# Patient Record
Sex: FEMALE | Race: BLACK OR AFRICAN AMERICAN | NOT HISPANIC OR LATINO | URBAN - METROPOLITAN AREA
[De-identification: names, ages, dates, MRNs, and addresses within clinical notes are randomized per-mention and may not be internally consistent; named-entity substitution may affect disease eponyms.]

---

## 2017-11-01 ENCOUNTER — OUTPATIENT (OUTPATIENT)
Dept: OUTPATIENT SERVICES | Facility: HOSPITAL | Age: 37
LOS: 1 days | End: 2017-11-01
Payer: MEDICAID

## 2017-11-01 DIAGNOSIS — K08.409 PARTIAL LOSS OF TEETH, UNSPECIFIED CAUSE, UNSPECIFIED CLASS: Chronic | ICD-10-CM

## 2017-11-01 PROCEDURE — G9001: CPT

## 2017-11-13 ENCOUNTER — EMERGENCY (EMERGENCY)
Facility: HOSPITAL | Age: 37
LOS: 1 days | Discharge: HOME | End: 2017-11-13
Attending: EMERGENCY MEDICINE | Admitting: EMERGENCY MEDICINE
Payer: MEDICAID

## 2017-11-13 VITALS
RESPIRATION RATE: 16 BRPM | DIASTOLIC BLOOD PRESSURE: 80 MMHG | SYSTOLIC BLOOD PRESSURE: 138 MMHG | HEART RATE: 68 BPM | TEMPERATURE: 98 F | OXYGEN SATURATION: 100 %

## 2017-11-13 VITALS
SYSTOLIC BLOOD PRESSURE: 115 MMHG | HEART RATE: 58 BPM | OXYGEN SATURATION: 100 % | DIASTOLIC BLOOD PRESSURE: 82 MMHG | RESPIRATION RATE: 17 BRPM

## 2017-11-13 DIAGNOSIS — K08.409 PARTIAL LOSS OF TEETH, UNSPECIFIED CAUSE, UNSPECIFIED CLASS: Chronic | ICD-10-CM

## 2017-11-13 LAB
ALBUMIN SERPL ELPH-MCNC: 3.8 G/DL — SIGNIFICANT CHANGE UP (ref 3.3–5)
ALP SERPL-CCNC: 44 U/L — SIGNIFICANT CHANGE UP (ref 40–120)
ALT FLD-CCNC: 8 U/L — SIGNIFICANT CHANGE UP (ref 4–33)
APPEARANCE UR: CLEAR — SIGNIFICANT CHANGE UP
APTT BLD: 29.9 SEC — SIGNIFICANT CHANGE UP (ref 27.5–37.4)
AST SERPL-CCNC: 14 U/L — SIGNIFICANT CHANGE UP (ref 4–32)
BACTERIA # UR AUTO: SIGNIFICANT CHANGE UP
BASOPHILS # BLD AUTO: 0.05 K/UL — SIGNIFICANT CHANGE UP (ref 0–0.2)
BASOPHILS NFR BLD AUTO: 1.1 % — SIGNIFICANT CHANGE UP (ref 0–2)
BILIRUB SERPL-MCNC: 0.4 MG/DL — SIGNIFICANT CHANGE UP (ref 0.2–1.2)
BILIRUB UR-MCNC: NEGATIVE — SIGNIFICANT CHANGE UP
BLD GP AB SCN SERPL QL: NEGATIVE — SIGNIFICANT CHANGE UP
BLOOD UR QL VISUAL: HIGH
BUN SERPL-MCNC: 9 MG/DL — SIGNIFICANT CHANGE UP (ref 7–23)
CALCIUM SERPL-MCNC: 8.3 MG/DL — LOW (ref 8.4–10.5)
CHLORIDE SERPL-SCNC: 104 MMOL/L — SIGNIFICANT CHANGE UP (ref 98–107)
CO2 SERPL-SCNC: 22 MMOL/L — SIGNIFICANT CHANGE UP (ref 22–31)
COLOR SPEC: SIGNIFICANT CHANGE UP
CREAT SERPL-MCNC: 0.63 MG/DL — SIGNIFICANT CHANGE UP (ref 0.5–1.3)
EOSINOPHIL # BLD AUTO: 0.1 K/UL — SIGNIFICANT CHANGE UP (ref 0–0.5)
EOSINOPHIL NFR BLD AUTO: 2.1 % — SIGNIFICANT CHANGE UP (ref 0–6)
GLUCOSE SERPL-MCNC: 79 MG/DL — SIGNIFICANT CHANGE UP (ref 70–99)
GLUCOSE UR-MCNC: NEGATIVE — SIGNIFICANT CHANGE UP
HCG SERPL-ACNC: 361.3 MIU/ML — SIGNIFICANT CHANGE UP
HCT VFR BLD CALC: 30.6 % — LOW (ref 34.5–45)
HGB BLD-MCNC: 10 G/DL — LOW (ref 11.5–15.5)
IMM GRANULOCYTES # BLD AUTO: 0.01 # — SIGNIFICANT CHANGE UP
IMM GRANULOCYTES NFR BLD AUTO: 0.2 % — SIGNIFICANT CHANGE UP (ref 0–1.5)
INR BLD: 1.07 — SIGNIFICANT CHANGE UP (ref 0.88–1.17)
KETONES UR-MCNC: NEGATIVE — SIGNIFICANT CHANGE UP
LEUKOCYTE ESTERASE UR-ACNC: NEGATIVE — SIGNIFICANT CHANGE UP
LYMPHOCYTES # BLD AUTO: 1.15 K/UL — SIGNIFICANT CHANGE UP (ref 1–3.3)
LYMPHOCYTES # BLD AUTO: 24.2 % — SIGNIFICANT CHANGE UP (ref 13–44)
MCHC RBC-ENTMCNC: 24.5 PG — LOW (ref 27–34)
MCHC RBC-ENTMCNC: 32.7 % — SIGNIFICANT CHANGE UP (ref 32–36)
MCV RBC AUTO: 75 FL — LOW (ref 80–100)
MONOCYTES # BLD AUTO: 0.6 K/UL — SIGNIFICANT CHANGE UP (ref 0–0.9)
MONOCYTES NFR BLD AUTO: 12.6 % — SIGNIFICANT CHANGE UP (ref 2–14)
MUCOUS THREADS # UR AUTO: SIGNIFICANT CHANGE UP
NEUTROPHILS # BLD AUTO: 2.84 K/UL — SIGNIFICANT CHANGE UP (ref 1.8–7.4)
NEUTROPHILS NFR BLD AUTO: 59.8 % — SIGNIFICANT CHANGE UP (ref 43–77)
NITRITE UR-MCNC: NEGATIVE — SIGNIFICANT CHANGE UP
NRBC # FLD: 0 — SIGNIFICANT CHANGE UP
PH UR: 6 — SIGNIFICANT CHANGE UP (ref 4.6–8)
PLATELET # BLD AUTO: 153 K/UL — SIGNIFICANT CHANGE UP (ref 150–400)
PMV BLD: 13.1 FL — HIGH (ref 7–13)
POTASSIUM SERPL-MCNC: 3.7 MMOL/L — SIGNIFICANT CHANGE UP (ref 3.5–5.3)
POTASSIUM SERPL-SCNC: 3.7 MMOL/L — SIGNIFICANT CHANGE UP (ref 3.5–5.3)
PROT SERPL-MCNC: 7 G/DL — SIGNIFICANT CHANGE UP (ref 6–8.3)
PROT UR-MCNC: NEGATIVE — SIGNIFICANT CHANGE UP
PROTHROM AB SERPL-ACNC: 12 SEC — SIGNIFICANT CHANGE UP (ref 9.8–13.1)
RBC # BLD: 4.08 M/UL — SIGNIFICANT CHANGE UP (ref 3.8–5.2)
RBC # FLD: 16.7 % — HIGH (ref 10.3–14.5)
RBC CASTS # UR COMP ASSIST: SIGNIFICANT CHANGE UP (ref 0–?)
RH IG SCN BLD-IMP: POSITIVE — SIGNIFICANT CHANGE UP
SODIUM SERPL-SCNC: 140 MMOL/L — SIGNIFICANT CHANGE UP (ref 135–145)
SP GR SPEC: 1.02 — SIGNIFICANT CHANGE UP (ref 1–1.03)
SQUAMOUS # UR AUTO: SIGNIFICANT CHANGE UP
UROBILINOGEN FLD QL: NORMAL E.U. — SIGNIFICANT CHANGE UP (ref 0.1–0.2)
WBC # BLD: 4.75 K/UL — SIGNIFICANT CHANGE UP (ref 3.8–10.5)
WBC # FLD AUTO: 4.75 K/UL — SIGNIFICANT CHANGE UP (ref 3.8–10.5)
WBC UR QL: SIGNIFICANT CHANGE UP (ref 0–?)

## 2017-11-13 PROCEDURE — 99285 EMERGENCY DEPT VISIT HI MDM: CPT

## 2017-11-13 PROCEDURE — 76830 TRANSVAGINAL US NON-OB: CPT | Mod: 26

## 2017-11-13 RX ORDER — MORPHINE SULFATE 50 MG/1
4 CAPSULE, EXTENDED RELEASE ORAL ONCE
Qty: 0 | Refills: 0 | Status: DISCONTINUED | OUTPATIENT
Start: 2017-11-13 | End: 2017-11-13

## 2017-11-13 RX ORDER — ACETAMINOPHEN 500 MG
975 TABLET ORAL ONCE
Qty: 0 | Refills: 0 | Status: COMPLETED | OUTPATIENT
Start: 2017-11-13 | End: 2017-11-13

## 2017-11-13 RX ADMIN — MORPHINE SULFATE 4 MILLIGRAM(S): 50 CAPSULE, EXTENDED RELEASE ORAL at 15:25

## 2017-11-13 RX ADMIN — Medication 975 MILLIGRAM(S): at 13:30

## 2017-11-13 RX ADMIN — MORPHINE SULFATE 4 MILLIGRAM(S): 50 CAPSULE, EXTENDED RELEASE ORAL at 15:40

## 2017-11-13 NOTE — ED ADULT TRIAGE NOTE - CHIEF COMPLAINT QUOTE
Pt was sent  in by urgent care to R/O ectopic pregnancy pt is pregnant not sure how many weeks.  Pt c/o of pain left lower quadrant.  LMP 10/14/2017.

## 2017-11-13 NOTE — ED PROVIDER NOTE - OBJECTIVE STATEMENT
37f with no sig pmh, G5A2P2 p/w llp pain x 1 day and resolved vaginal bleeding. had bleeding monday to sunday, then started getting pain - 8/10 in llq. went to urgent care which sent her to ed to r/o ectopic as had + pregnancy test. + dysuria; no f/c/cough/cold/diarrhea/dysuria;

## 2017-11-13 NOTE — ED PROVIDER NOTE - PROGRESS NOTE DETAILS
pt tolerated po, will follow up with obgyn in 24hrs or return to ed for continued work up if cannot get apt;

## 2017-11-13 NOTE — ED PROVIDER NOTE - PSH
History of  X 2  2000  No significant past surgical history    repair of right pinky-tendon  2006  ? flexor tendon  S/P tooth extraction

## 2017-11-13 NOTE — ED PROVIDER NOTE - PHYSICAL EXAMINATION
pelvic exam: chaperoned by SILVA Chase: closed os, minimal left adnexal tenderness, no bleeding/discharge

## 2017-11-13 NOTE — ED PROVIDER NOTE - ATTENDING CONTRIBUTION TO CARE
Attending note:   After face to face evaluation of this patient, I concur with above noted hx, pe, and care plan for this patient. +Pregnant with l pelvic pain; patient not aware of pregnancy until today, when ucg positive in [facility].   +Pelvic pain left; ultrasound pending.

## 2017-11-13 NOTE — ED PROVIDER NOTE - MEDICAL DECISION MAKING DETAILS
llq pain, r/o ectopic, cbc, cmp type and screen, UA, hcg, pain  control, transvaginal sono, re-assess

## 2017-11-14 DIAGNOSIS — R69 ILLNESS, UNSPECIFIED: ICD-10-CM

## 2017-11-14 LAB — SPECIMEN SOURCE: SIGNIFICANT CHANGE UP

## 2017-11-15 ENCOUNTER — EMERGENCY (EMERGENCY)
Facility: HOSPITAL | Age: 37
LOS: 1 days | Discharge: HOME | End: 2017-11-15
Admitting: EMERGENCY MEDICINE
Payer: MEDICAID

## 2017-11-15 VITALS
DIASTOLIC BLOOD PRESSURE: 77 MMHG | HEART RATE: 57 BPM | SYSTOLIC BLOOD PRESSURE: 125 MMHG | RESPIRATION RATE: 20 BRPM | OXYGEN SATURATION: 100 % | TEMPERATURE: 98 F

## 2017-11-15 VITALS
OXYGEN SATURATION: 100 % | DIASTOLIC BLOOD PRESSURE: 66 MMHG | SYSTOLIC BLOOD PRESSURE: 113 MMHG | RESPIRATION RATE: 18 BRPM | TEMPERATURE: 98 F | HEART RATE: 67 BPM

## 2017-11-15 DIAGNOSIS — Z34.90 ENCOUNTER FOR SUPERVISION OF NORMAL PREGNANCY, UNSPECIFIED, UNSPECIFIED TRIMESTER: ICD-10-CM

## 2017-11-15 DIAGNOSIS — K08.409 PARTIAL LOSS OF TEETH, UNSPECIFIED CAUSE, UNSPECIFIED CLASS: Chronic | ICD-10-CM

## 2017-11-15 LAB
BACTERIA UR CULT: SIGNIFICANT CHANGE UP
HCG SERPL-ACNC: 878.1 MIU/ML — SIGNIFICANT CHANGE UP

## 2017-11-15 PROCEDURE — 76830 TRANSVAGINAL US NON-OB: CPT | Mod: 26

## 2017-11-15 PROCEDURE — 99243 OFF/OP CNSLTJ NEW/EST LOW 30: CPT | Mod: GC

## 2017-11-15 PROCEDURE — 99285 EMERGENCY DEPT VISIT HI MDM: CPT

## 2017-11-15 NOTE — CONSULT NOTE ADULT - ASSESSMENT
A/P   37y  G 7P2   presents with c/o vaginal bleeding,  abdominal pain, and cramping  found to have a b-HCG of  361.3 ()-> 878.1 (11/15)   .  Differenital includes threatened AB vs. ectopic pregnancy vs. viable IUP vs. spontanous  in progress.  Difficult to assess at this time however given appropriate rise in bhcg from 2 days ago is reassuring for likely a normal intrauterine pregnancy.  However, too difficult to make that determination at this time.

## 2017-11-15 NOTE — CONSULT NOTE ADULT - SUBJECTIVE AND OBJECTIVE BOX
ALY TRAN  37y  Female [id number]    HPI:  38 y/o  w/ LMP 10/14/17 (4w3d by LMP) here c/o abdominal cramping and bleeding.  Patient first had cramping and bleeding last monday for 2 days- not heavy- about 1 pad/day.  Patient reports that since then she had intermittant spotting.  On  she took a pregnancy test at home which was negative.  On Monday patient reports cramping and spotting continued so she went to urgent care who told her to come to the ED.  Cramping described as pressure in lower abdomen as well as some sharp pain.  Pain at time radiates to left leg and back.  Taking ibuprofen with relief of pain.  Denies ever having anything like this with her previous pregnancies.   Patinet denies vaginal discharge, itching.  Denies urinary pain, burning, hesitancy.  Is sexually active with one male parnter.  Last had sex .  Reports same pressure/pain with intercourse.  Patient is currently using withdrawal method for birth control.  This was unplanned pregnancy.  Patient is unsure if she desires this pregnancy.    +dizziness intermittently     Review of Systems:   Patient denies headaches, blurry vision, lightheadedness, CP, SOB, fevers, chills, nausea, vomiting, diarrhea, joint pain, leg edema.  +constipation    Name of GYN Physician:  Previously saw Dr. Astudillo however she no longer takes her insurance.  Was trying to get appt with [facility] women center in [place] but was unable to get appt until Dec 1st.      POB:  , -  x 2.   , , - eTOP x 3 w/ D&C.  - SAB x1 w/ D&C.     Pgyn: +fibroids (never had any surgery or intervention for them- was told they are small),  +cysts  (cysts that come and go- never had an intervention for them)   Denies endometriosis, STI's, Abnormal pap smears     Home meds: denies       Allergies    bananas, strawberries (Unknown)  latex (Rash)  peanuts (Anaphylaxis)  penicillin (Rash; Angioedema)  penicillin (Rash; Stomach Upset)  penicillin (Unknown)  shellfish. (Swelling)  Tree Nuts (Anaphylaxis)    Intolerances        PAST MEDICAL & SURGICAL HISTORY:  No pertinent past medical history  Obesity  S/P tooth extraction  repair of right pinky-tendon:   ? flexor tendon  History of  X 2: 2000    Social History:  Denies smoking use, drug use,   +occasional social alcohol use    Vital Signs Last 24 Hrs  T(C): 36.7 (15 Nov 2017 09:29), Max: 36.7 (15 Nov 2017 09:29)  T(F): 98 (15 Nov 2017 09:29), Max: 98 (15 Nov 2017 09:29)  HR: 74 (15 Nov 2017 12:50) (57 - 74)  BP: 117/80 (15 Nov 2017 12:50) (117/80 - 125/77)  BP(mean): --  RR: 18 (15 Nov 2017 12:50) (18 - 20)  SpO2: 99% (15 Nov 2017 12:50) (99% - 100%)    Physical Exam:   General: sitting comfortably in bed, NAD   HEENT: neck supple, full ROM  CV: RR S1S2 no m/r/g  Lungs: CTA b/l, good air flow b/l   Back: No CVA tenderness  Abd: Soft, non-tender, non-distended.  Bowel sounds present.    :  No bleeding on pad.    External labia wnl.  Bimanual exam with no cervical motion tenderness, uterus wnl, adnexa with no masses or fullness felt.   Mild L adnexal tenderness.  No rebound or guarding.  Cervix closed.  Speculum Exam: No active bleeding from os.  No old blood in os. Physiologic discharge.    Ext: non-tender b/l, no edema     LABS:      bhcg 361.3 ()-> 878.1 (11/15)          I&O's Detail    EXAM:  US TRANSVAGINAL        *** ADDENDUM 11/15/2017  ***    Typographical error in the impression of the initial report, which should   read as follows: Patient reportedly has a POSITIVE pregnancy test. No   intrauterine gestational sac or  suspicious adnexal mass. Differential diagnosis includes early   intrauterine  pregnancy, ectopic pregnancy and failed pregnancy. Close follow-up with  ultrasound and beta hCG levels is necessary to exclude ectopic pregnancy.      *** END OF ADDENDUM 11/15/2017  ***      PROCEDURE DATE:  2017         INTERPRETATION:  CLINICAL INFORMATION: Cramping, left-sided pain and   bleeding last week.    LMP: 2017.  Estimated gestational age by LMP: 4 weeks, 2 days    COMPARISON: 2007.    TECHNIQUE:     Endovaginal pelvic sonogram. Color and Spectral Doppler was performed.    FINDINGS:    Uterus: 12.0 x 5.5 x 7.3 cm.     Endometrium: 12 mm. No intrauterine gestation is identified.    Right ovary: 3.9 x 2.4 x 2.4 cm. Within normal limits.    Left ovary: 4.0 x 2.3 x 2.8 cm. Corpus luteum, measuring 2.1 x 1.5 x 2.1   cm.    Fluid: None.    IMPRESSION:  Patient reportedly has a pregnancy test. No intrauterine gestational sac   or suspicious adnexal mass. Differential diagnosis includes early   intrauterine pregnancy, ectopic pregnancy and failed pregnancy. Close   follow-up with ultrasound and beta hCG levels is necessary to exclude   ectopic pregnancy.         ***Please see the addendum at the top of this report. It may contain   additional important information or changes.****              RADIOLOGY & ADDITIONAL STUDIES:            EXAM:  US TRANSVAGINAL        *** ADDENDUM 11/15/2017  ***    Typographical error in the impression of the initial report, which should   read as follows: Patient reportedly has a POSITIVE pregnancy test. No   intrauterine gestational sac or  suspicious adnexal mass. Differential diagnosis includes early   intrauterine  pregnancy, ectopic pregnancy and failed pregnancy. Close follow-up with  ultrasound and beta hCG levels is necessary to exclude ectopic pregnancy.      *** END OF ADDENDUM 11/15/2017  ***      PROCEDURE DATE:  2017         INTERPRETATION:  CLINICAL INFORMATION: Cramping, left-sided pain and   bleeding last week.    LMP: 2017.  Estimated gestational age by LMP: 4 weeks, 2 days    COMPARISON: 2007.    TECHNIQUE:     Endovaginal pelvic sonogram. Color and Spectral Doppler was performed.    FINDINGS:    Uterus: 12.0 x 5.5 x 7.3 cm.     Endometrium: 12 mm. No intrauterine gestation is identified.    Right ovary: 3.9 x 2.4 x 2.4 cm. Within normal limits.    Left ovary: 4.0 x 2.3 x 2.8 cm. Corpus luteum, measuring 2.1 x 1.5 x 2.1   cm.    Fluid: None.    IMPRESSION:  Patient reportedly has a pregnancy test. No intrauterine gestational sac   or suspicious adnexal mass. Differential diagnosis includes early   intrauterine pregnancy, ectopic pregnancy and failed pregnancy. Close   follow-up with ultrasound and beta hCG levels is necessary to exclude   ectopic pregnancy.         ***Please see the addendum at the top of this report. It may contain   additional important information or changes.****      EXAM:  US TRANSVAGINAL        PROCEDURE DATE:  Nov 15 2017         INTERPRETATION:  CLINICAL INFORMATION: Episode of bleeding. Follow-up   prior pelvic ultrasound which did not demonstrate an intrauterine   gestation.    LMP: 2017.  Estimated gestational age by LMP: 4 weeks, 4 days    COMPARISON: None available.    TECHNIQUE:     Transabdominal and Endovaginal pelvic sonogram. Color and Spectral   Doppler was performed.    FINDINGS:    Uterus: 10.9 x 6.3 cm. Endometrial stripe measures 1.5 cm. No   intrauterine gestation.    Right ovary: 2.8 x 2.0 x 2.2 cm. Within normal limits.    Left ovary: 4.1 x 2.5 x 2.4 cm. Within normal limits.    Fluid: None.    IMPRESSION:  Positive beta hCG without intrauterine gestational sac or suspicious   adnexal mass. Differential diagnosis includes early intrauterine   pregnancy, ectopic pregnancy and failed pregnancy. Close follow-up with   ultrasound and beta hCG levels is necessary to exclude ectopic pregnancy.                   THOMAS ESQUIVEL M.D., ATTENDING RADIOLOGIST  This document has been electronically signed. Nov 15 2017  1:47PM

## 2017-11-15 NOTE — ED PROVIDER NOTE - PROGRESS NOTE DETAILS
beta increased from 361 to 878 appropriate for about 5 weeks gestation.  radiology unable to view gestational sac may be to early.  pt agrees to return to the ed for monitoring discussed with ob - patient to return to the ed in 2 days 11/17 for repeat beta hcg and on sunday 11/19 for repeat beta hcg and vaginal u/s.  pt may then follow up with her ob.  pt agrees with the plan discussed with ob - patient to return to the ed in 2 days 11/17 for repeat beta hcg and on sunday 11/19 for repeat beta hcg and vaginal u/s.  pt may then follow up with her ob.  pt agrees with the plan and cannot rule out ectopic at this time

## 2017-11-15 NOTE — ED PROVIDER NOTE - CHPI ED SYMPTOMS NEG
no fever/no abdominal pain/no chills/no vomiting/no back pain/no pain/no nausea/no discharge/no dysuria

## 2017-11-15 NOTE — ED PROVIDER NOTE - PLAN OF CARE
return to the ed in 2 days 11/17 for repeat beta hcg and on sunday 11/19 for repeat beta hcg and vaginal u/s.  pt may then follow up with her ob.  pt agrees with the plan

## 2017-11-15 NOTE — ED ADULT TRIAGE NOTE - CHIEF COMPLAINT QUOTE
pt with  cramping and vaginal spotting was seen in ED on 11/13, pt advised to return for repeat HCG and u/s . Pt was unable to see her own GYN .

## 2017-11-15 NOTE — CONSULT NOTE ADULT - PROBLEM SELECTOR RECOMMENDATION 9
-patient to follow up in 48 hours for repeat b-HCG in the ER  -Ectopic precautions reviewed with patient and  at bedside.  Discussed with patient importance of follow up for b-HCG given unknown pregnancy location at this time.  Patient expressed understanding.  All questions and concerns addressed to patient's apparent satisfaction.    -patient to be added to GYN b-hcg list.   -patient stable for d/c home from GYN perspective.  Primary managment per ED team.      Nadine Dominguez PGY-2   Patient to be seen by attending.

## 2017-11-15 NOTE — ED PROVIDER NOTE - OBJECTIVE STATEMENT
37 year old female G5A2P2 denies pmhx, pshx c section x2, flexor tendon and rotator repairs.  presents today for repeat bet hcg and transvaginal u/s.  LMP 10/14 developed vaginal bleeding 11/12 was seen at [facility] on 37 year old female G5A2P2 denies pmhx, pshx c section x2, flexor tendon and rotator repairs.  presents today for repeat beta hcg and transvaginal u/s.  LMP 10/14, developed vaginal bleeding, evaluated in ED at [facility] on 11/13 and instructed to follow up with ob in 2 days or return to ed.  patient unable to obtain appointment with ob until 12/1.  pain improved bleeding decreased.

## 2017-11-17 ENCOUNTER — EMERGENCY (EMERGENCY)
Facility: HOSPITAL | Age: 37
LOS: 1 days | Discharge: HOME | End: 2017-11-17
Attending: EMERGENCY MEDICINE | Admitting: EMERGENCY MEDICINE
Payer: MEDICAID

## 2017-11-17 VITALS
SYSTOLIC BLOOD PRESSURE: 125 MMHG | RESPIRATION RATE: 18 BRPM | TEMPERATURE: 98 F | DIASTOLIC BLOOD PRESSURE: 75 MMHG | OXYGEN SATURATION: 100 % | HEART RATE: 60 BPM

## 2017-11-17 DIAGNOSIS — Z34.90 ENCOUNTER FOR SUPERVISION OF NORMAL PREGNANCY, UNSPECIFIED, UNSPECIFIED TRIMESTER: ICD-10-CM

## 2017-11-17 DIAGNOSIS — K08.409 PARTIAL LOSS OF TEETH, UNSPECIFIED CAUSE, UNSPECIFIED CLASS: Chronic | ICD-10-CM

## 2017-11-17 LAB — HCG SERPL-ACNC: 2259 MIU/ML — SIGNIFICANT CHANGE UP

## 2017-11-17 PROCEDURE — 99284 EMERGENCY DEPT VISIT MOD MDM: CPT

## 2017-11-17 PROCEDURE — 99243 OFF/OP CNSLTJ NEW/EST LOW 30: CPT

## 2017-11-17 PROCEDURE — 76830 TRANSVAGINAL US NON-OB: CPT | Mod: 26

## 2017-11-17 NOTE — ED PROVIDER NOTE - MEDICAL DECISION MAKING DETAILS
Will repeat beta, last beta around 800, early to expect to see anything on US which was appropriate at that time, will contact OB/GYN.

## 2017-11-17 NOTE — CONSULT NOTE ADULT - SUBJECTIVE AND OBJECTIVE BOX
ALY TRAN  37y  Female [id number]    HPI:  36 y/o  w/ LMP 10/14/17 (4w5d by LMP) here c/o abdominal cramping and bleeding.  Patient reports intermittent abdominal cramping- mild- takes advil for relief.    Denies any further vaginal bleeding    Review of Systems:   Patient denies headaches, blurry vision, lightheadness, dizziness, CP, SOB, fevers, chills, nausea, vomiting, diarrhea, constipation, joint pain, leg edema.     Name of GYN Physician:  previously saw Dr. Astudillo however she no longer takes her insurance.  [facility] [place] but was unable to get appt until Dec 1st.        POB:  , -  x 2.   , , - eTOP x 3 w/ D&C.  - SAB x1 w/ D&C.     Pgyn: +fibroids (never had any surgery or intervention for them- was told they are small),  +cysts  (cysts that come and go- never had an intervention for them)   Denies endometriosis, STI's, Abnormal pap smears     Home meds: denies       Allergies    bananas, strawberries (Unknown)  latex (Rash)  peanuts (Anaphylaxis)  penicillin (Rash; Angioedema)  penicillin (Rash; Stomach Upset)  penicillin (Unknown)  shellfish. (Swelling)  Tree Nuts (Anaphylaxis)    Intolerances        PAST MEDICAL & SURGICAL HISTORY:  No pertinent past medical history  Obesity  S/P tooth extraction  repair of right pinky-tendon:   ? flexor tendon  History of  X 2: 2000    Social History:  Denies smoking use, drug use,   +occasional social alcohol use    Vital Signs Last 24 Hrs  T(C): 36.9 (2017 09:15), Max: 36.9 (2017 09:15)  T(F): 98.4 (2017 09:15), Max: 98.4 (2017 09:15)  HR: 60 (2017 09:15) (60 - 60)  BP: 125/75 (2017 09:15) (125/75 - 125/75)  BP(mean): --  RR: 18 (2017 09:15) (18 - 18)  SpO2: 100% (2017 09:15) (100% - 100%)    Physical Exam:   General: sitting comftorably in bed, NAD   HEENT: neck supple, full ROM  Abd: Soft, non-tender, non-distended.  Bowel sounds present.    Ext: non-tender b/l, no edema     LABS:      bhcg 361.3-> 878.1 (11/15)-> 2259 ()          I&O's Detail          RADIOLOGY & ADDITIONAL STUDIES:

## 2017-11-17 NOTE — CONSULT NOTE ADULT - PROBLEM SELECTOR RECOMMENDATION 9
-would recommend TVUS today to assess if any formation of gestational sac in uterus given b-hcg above discrimanatory zone and patient with continued abdomianl cramping  -if no GS w/ yolk sac seen will have patietn follow up in ED again on Sunday 11/19  -if GS w/ yolk sac seen patient can follow up with OBGYN on Dec. 1st and will be removed from GYN b-hcg list.   -GYN will continue to follow for now     Nadine Dominguez PGY-2   d/w Dr. Coyle -would recommend TVUS today to assess if any formation of gestational sac in uterus given b-hcg above discrimanatory zone and patient with continued abdominal cramping  -GYN will continue to follow for now     Nadine Dominguez PGY-2   d/w Dr. Coyle

## 2017-11-17 NOTE — CONSULT NOTE ADULT - ASSESSMENT
38 y/o  here for repeat b-hcg.  B-hcg has been rising appropriately.  Reassuring for normal intra-uterine pregnancy.  However, still difficult to assess at this time given continued abdominal cramping.

## 2017-11-17 NOTE — CHART NOTE - NSCHARTNOTEFT_GEN_A_CORE
R2 OB Update     Patient called to remind her to come for follow up b-hcg today in ER.  Patient reports she is on her way in now.  Denies any further bleeding but states she is still having some lower abdominal cramping.  Will f/u cg today when patient arrives in Ed.     Nadine Dominguez PGY-2

## 2017-11-17 NOTE — ED PROVIDER NOTE - PROGRESS NOTE DETAILS
Esteban COX- pt seen by gyn, taken off beta list and now can f/u with [facility] in 1 wk

## 2017-11-17 NOTE — ED PROVIDER NOTE - OBJECTIVE STATEMENT
36 y/o F , LMP 10/14 presents w/ pelvic cramping. told to come back to repeat beta as she had low beta hcg and empty gestational sac. Pt has no vag bleeding. Had x1 miscarriage in the past and rest of abortions were elective. Otherwise no nausea, vomiting, dysuria, no other complaints.

## 2017-11-17 NOTE — ED ADULT TRIAGE NOTE - CHIEF COMPLAINT QUOTE
pt arrives for repeat serum beta.  pt seen here 2 times this week.  Pt states "they say I am pregnant but cant see a pregnancy on sono."  c/o intermittent abd cramps.  denies vaginal bleeding.   well appearing female in nad.

## 2017-11-17 NOTE — CONSULT NOTE ADULT - ATTENDING COMMENTS
Pt seen and examined by me.  Probable early IUP with appropriate rise in Bhcg and resolution in bleeding.  F/up in 1wk or PRN.

## 2017-11-30 ENCOUNTER — APPOINTMENT (OUTPATIENT)
Dept: OBGYN | Facility: CLINIC | Age: 37
End: 2017-11-30
Payer: MEDICAID

## 2017-11-30 ENCOUNTER — ASOB RESULT (OUTPATIENT)
Age: 37
End: 2017-11-30

## 2017-11-30 VITALS
HEIGHT: 65 IN | BODY MASS INDEX: 32.15 KG/M2 | DIASTOLIC BLOOD PRESSURE: 76 MMHG | WEIGHT: 193 LBS | HEART RATE: 54 BPM | SYSTOLIC BLOOD PRESSURE: 132 MMHG

## 2017-11-30 DIAGNOSIS — Z78.9 OTHER SPECIFIED HEALTH STATUS: ICD-10-CM

## 2017-11-30 DIAGNOSIS — Z83.49 FAMILY HISTORY OF OTHER ENDOCRINE, NUTRITIONAL AND METABOLIC DISEASES: ICD-10-CM

## 2017-11-30 DIAGNOSIS — Z82.49 FAMILY HISTORY OF ISCHEMIC HEART DISEASE AND OTHER DISEASES OF THE CIRCULATORY SYSTEM: ICD-10-CM

## 2017-11-30 DIAGNOSIS — Z64.0 PROBLEMS RELATED TO UNWANTED PREGNANCY: ICD-10-CM

## 2017-11-30 DIAGNOSIS — Z80.3 FAMILY HISTORY OF MALIGNANT NEOPLASM OF BREAST: ICD-10-CM

## 2017-11-30 DIAGNOSIS — Z83.3 FAMILY HISTORY OF DIABETES MELLITUS: ICD-10-CM

## 2017-11-30 PROCEDURE — 99213 OFFICE O/P EST LOW 20 MIN: CPT

## 2017-11-30 PROCEDURE — 76817 TRANSVAGINAL US OBSTETRIC: CPT

## 2017-12-01 LAB
BASOPHILS # BLD AUTO: 0.05 K/UL
BASOPHILS NFR BLD AUTO: 0.8 %
EOSINOPHIL # BLD AUTO: 0.07 K/UL
EOSINOPHIL NFR BLD AUTO: 1.2 %
HCT VFR BLD CALC: 36.5 %
HGB BLD-MCNC: 11.4 G/DL
IMM GRANULOCYTES NFR BLD AUTO: 0.2 %
LYMPHOCYTES # BLD AUTO: 1.64 K/UL
LYMPHOCYTES NFR BLD AUTO: 27.8 %
MAN DIFF?: NORMAL
MCHC RBC-ENTMCNC: 23.9 PG
MCHC RBC-ENTMCNC: 31.2 GM/DL
MCV RBC AUTO: 76.5 FL
MONOCYTES # BLD AUTO: 0.62 K/UL
MONOCYTES NFR BLD AUTO: 10.5 %
NEUTROPHILS # BLD AUTO: 3.5 K/UL
NEUTROPHILS NFR BLD AUTO: 59.5 %
PLATELET # BLD AUTO: 191 K/UL
RBC # BLD: 4.77 M/UL
RBC # FLD: 17.9 %
WBC # FLD AUTO: 5.89 K/UL

## 2017-12-01 NOTE — CHART NOTE - NSCHARTNOTEFT_GEN_A_CORE
R2 OB Update    Patient confirmed follow up with private OBGYN on 11/30 w/ Dr. Coyle with US showing +IUP.  Patient to be removed from GYN b-hcg list.     Nadine Dominguez PGY-2

## 2017-12-04 LAB
ABO + RH PNL BLD: NORMAL
BACTERIA UR CULT: NORMAL
BLD GP AB SCN SERPL QL: NORMAL
C TRACH RRNA SPEC QL NAA+PROBE: NOT DETECTED
N GONORRHOEA RRNA SPEC QL NAA+PROBE: NOT DETECTED
SOURCE AMPLIFICATION: NORMAL

## 2017-12-11 ENCOUNTER — OUTPATIENT (OUTPATIENT)
Dept: OUTPATIENT SERVICES | Facility: HOSPITAL | Age: 37
LOS: 1 days | End: 2017-12-11
Payer: MEDICAID

## 2017-12-11 ENCOUNTER — APPOINTMENT (OUTPATIENT)
Dept: OBGYN | Facility: CLINIC | Age: 37
End: 2017-12-11
Payer: MEDICAID

## 2017-12-11 VITALS — SYSTOLIC BLOOD PRESSURE: 120 MMHG | DIASTOLIC BLOOD PRESSURE: 78 MMHG | WEIGHT: 193 LBS | BODY MASS INDEX: 32.12 KG/M2

## 2017-12-11 DIAGNOSIS — K08.409 PARTIAL LOSS OF TEETH, UNSPECIFIED CAUSE, UNSPECIFIED CLASS: Chronic | ICD-10-CM

## 2017-12-11 DIAGNOSIS — N76.0 ACUTE VAGINITIS: ICD-10-CM

## 2017-12-11 DIAGNOSIS — Z33.2 ENCOUNTER FOR ELECTIVE TERMINATION OF PREGNANCY: ICD-10-CM

## 2017-12-11 PROCEDURE — 99213 OFFICE O/P EST LOW 20 MIN: CPT

## 2017-12-11 PROCEDURE — G0463: CPT

## 2017-12-19 ENCOUNTER — RESULT REVIEW (OUTPATIENT)
Age: 37
End: 2017-12-19

## 2017-12-19 DIAGNOSIS — Z33.2 ENCOUNTER FOR ELECTIVE TERMINATION OF PREGNANCY: ICD-10-CM

## 2017-12-20 ENCOUNTER — APPOINTMENT (OUTPATIENT)
Dept: OBGYN | Facility: CLINIC | Age: 37
End: 2017-12-20
Payer: MEDICAID

## 2017-12-20 ENCOUNTER — OUTPATIENT (OUTPATIENT)
Dept: OUTPATIENT SERVICES | Facility: HOSPITAL | Age: 37
LOS: 1 days | End: 2017-12-20
Payer: MEDICAID

## 2017-12-20 VITALS — SYSTOLIC BLOOD PRESSURE: 140 MMHG | DIASTOLIC BLOOD PRESSURE: 82 MMHG | BODY MASS INDEX: 31.78 KG/M2 | WEIGHT: 191 LBS

## 2017-12-20 DIAGNOSIS — Z98.890 OTHER SPECIFIED POSTPROCEDURAL STATES: ICD-10-CM

## 2017-12-20 DIAGNOSIS — O03.4 INCOMPLETE SPONTANEOUS ABORTION W/OUT COMPLICATION: ICD-10-CM

## 2017-12-20 DIAGNOSIS — N76.0 ACUTE VAGINITIS: ICD-10-CM

## 2017-12-20 DIAGNOSIS — K08.409 PARTIAL LOSS OF TEETH, UNSPECIFIED CAUSE, UNSPECIFIED CLASS: Chronic | ICD-10-CM

## 2017-12-20 PROCEDURE — 58120 DILATION AND CURETTAGE: CPT | Mod: GC

## 2017-12-20 PROCEDURE — 58120 DILATION AND CURETTAGE: CPT

## 2017-12-21 ENCOUNTER — MESSAGE (OUTPATIENT)
Age: 37
End: 2017-12-21

## 2017-12-21 ENCOUNTER — MEDICATION RENEWAL (OUTPATIENT)
Age: 37
End: 2017-12-21

## 2017-12-28 ENCOUNTER — RESULT REVIEW (OUTPATIENT)
Age: 37
End: 2017-12-28

## 2017-12-28 DIAGNOSIS — O03.4 INCOMPLETE SPONTANEOUS ABORTION WITHOUT COMPLICATION: ICD-10-CM

## 2017-12-28 DIAGNOSIS — Z98.890 OTHER SPECIFIED POSTPROCEDURAL STATES: ICD-10-CM

## 2018-01-11 ENCOUNTER — APPOINTMENT (OUTPATIENT)
Dept: OBGYN | Facility: CLINIC | Age: 38
End: 2018-01-11
Payer: MEDICAID

## 2018-01-11 VITALS
DIASTOLIC BLOOD PRESSURE: 80 MMHG | HEIGHT: 65 IN | WEIGHT: 197 LBS | HEART RATE: 58 BPM | BODY MASS INDEX: 32.82 KG/M2 | SYSTOLIC BLOOD PRESSURE: 136 MMHG

## 2018-01-11 DIAGNOSIS — Z30.09 ENCOUNTER FOR OTHER GENERAL COUNSELING AND ADVICE ON CONTRACEPTION: ICD-10-CM

## 2018-01-11 DIAGNOSIS — N89.8 OTHER SPECIFIED NONINFLAMMATORY DISORDERS OF VAGINA: ICD-10-CM

## 2018-01-11 PROCEDURE — 99214 OFFICE O/P EST MOD 30 MIN: CPT

## 2018-01-12 PROBLEM — Z30.09 ENCOUNTER FOR OTHER GENERAL COUNSELING OR ADVICE ON CONTRACEPTION: Status: ACTIVE | Noted: 2018-01-12

## 2018-01-12 RX ORDER — MISOPROSTOL 200 UG/1
200 TABLET ORAL
Qty: 4 | Refills: 0 | Status: DISCONTINUED | COMMUNITY
Start: 2017-12-11 | End: 2018-01-12

## 2018-01-12 RX ORDER — OXYCODONE AND ACETAMINOPHEN 5; 325 MG/1; MG/1
5-325 TABLET ORAL
Qty: 6 | Refills: 0 | Status: DISCONTINUED | COMMUNITY
Start: 2017-12-11 | End: 2018-01-12

## 2018-01-12 RX ORDER — AZITHROMYCIN 500 MG/1
500 TABLET, FILM COATED ORAL
Qty: 2 | Refills: 0 | Status: DISCONTINUED | COMMUNITY
Start: 2017-12-11 | End: 2018-01-12

## 2018-01-12 RX ORDER — ONDANSETRON 4 MG/1
4 TABLET ORAL EVERY 6 HOURS
Qty: 4 | Refills: 0 | Status: DISCONTINUED | COMMUNITY
Start: 2017-12-11 | End: 2018-01-12

## 2018-01-12 RX ORDER — NORETHINDRONE ACETATE AND ETHINYL ESTRADIOL 1; .02 MG/1; MG/1
1-20 TABLET ORAL DAILY
Qty: 1 | Refills: 6 | Status: DISCONTINUED | COMMUNITY
Start: 2017-12-11 | End: 2018-01-12

## 2018-01-16 LAB
CANDIDA VAG CYTO: NOT DETECTED
G VAGINALIS+PREV SP MTYP VAG QL MICRO: NOT DETECTED
T VAGINALIS VAG QL WET PREP: NOT DETECTED

## 2018-02-03 ENCOUNTER — OUTPATIENT (OUTPATIENT)
Dept: OUTPATIENT SERVICES | Facility: HOSPITAL | Age: 38
LOS: 1 days | End: 2018-02-03

## 2018-02-03 VITALS
RESPIRATION RATE: 14 BRPM | OXYGEN SATURATION: 98 % | HEART RATE: 64 BPM | HEIGHT: 65.25 IN | SYSTOLIC BLOOD PRESSURE: 110 MMHG | DIASTOLIC BLOOD PRESSURE: 80 MMHG | TEMPERATURE: 97 F | WEIGHT: 192.02 LBS

## 2018-02-03 DIAGNOSIS — Z91.013 ALLERGY TO SEAFOOD: ICD-10-CM

## 2018-02-03 DIAGNOSIS — K08.409 PARTIAL LOSS OF TEETH, UNSPECIFIED CAUSE, UNSPECIFIED CLASS: Chronic | ICD-10-CM

## 2018-02-03 DIAGNOSIS — Z98.890 OTHER SPECIFIED POSTPROCEDURAL STATES: Chronic | ICD-10-CM

## 2018-02-03 DIAGNOSIS — Z30.09 ENCOUNTER FOR OTHER GENERAL COUNSELING AND ADVICE ON CONTRACEPTION: ICD-10-CM

## 2018-02-03 DIAGNOSIS — Z91.040 LATEX ALLERGY STATUS: ICD-10-CM

## 2018-02-03 LAB
BASOPHILS # BLD AUTO: 0.08 K/UL — SIGNIFICANT CHANGE UP (ref 0–0.2)
BASOPHILS NFR BLD AUTO: 2.1 % — HIGH (ref 0–2)
BLD GP AB SCN SERPL QL: NEGATIVE — SIGNIFICANT CHANGE UP
EOSINOPHIL # BLD AUTO: 0.11 K/UL — SIGNIFICANT CHANGE UP (ref 0–0.5)
EOSINOPHIL NFR BLD AUTO: 2.9 % — SIGNIFICANT CHANGE UP (ref 0–6)
HCT VFR BLD CALC: 30.4 % — LOW (ref 34.5–45)
HGB BLD-MCNC: 9.6 G/DL — LOW (ref 11.5–15.5)
IMM GRANULOCYTES # BLD AUTO: 0.01 # — SIGNIFICANT CHANGE UP
IMM GRANULOCYTES NFR BLD AUTO: 0.3 % — SIGNIFICANT CHANGE UP (ref 0–1.5)
LYMPHOCYTES # BLD AUTO: 1.11 K/UL — SIGNIFICANT CHANGE UP (ref 1–3.3)
LYMPHOCYTES # BLD AUTO: 28.8 % — SIGNIFICANT CHANGE UP (ref 13–44)
MCHC RBC-ENTMCNC: 23.1 PG — LOW (ref 27–34)
MCHC RBC-ENTMCNC: 31.6 % — LOW (ref 32–36)
MCV RBC AUTO: 73.3 FL — LOW (ref 80–100)
MONOCYTES # BLD AUTO: 0.63 K/UL — SIGNIFICANT CHANGE UP (ref 0–0.9)
MONOCYTES NFR BLD AUTO: 16.4 % — HIGH (ref 2–14)
NEUTROPHILS # BLD AUTO: 1.91 K/UL — SIGNIFICANT CHANGE UP (ref 1.8–7.4)
NEUTROPHILS NFR BLD AUTO: 49.5 % — SIGNIFICANT CHANGE UP (ref 43–77)
NRBC # FLD: 0 — SIGNIFICANT CHANGE UP
PLATELET # BLD AUTO: 232 K/UL — SIGNIFICANT CHANGE UP (ref 150–400)
PMV BLD: 13.1 FL — HIGH (ref 7–13)
RBC # BLD: 4.15 M/UL — SIGNIFICANT CHANGE UP (ref 3.8–5.2)
RBC # FLD: 15.9 % — HIGH (ref 10.3–14.5)
RH IG SCN BLD-IMP: POSITIVE — SIGNIFICANT CHANGE UP
WBC # BLD: 3.85 K/UL — SIGNIFICANT CHANGE UP (ref 3.8–10.5)
WBC # FLD AUTO: 3.85 K/UL — SIGNIFICANT CHANGE UP (ref 3.8–10.5)

## 2018-02-03 RX ORDER — SODIUM CHLORIDE 9 MG/ML
1000 INJECTION, SOLUTION INTRAVENOUS
Qty: 0 | Refills: 0 | Status: DISCONTINUED | OUTPATIENT
Start: 2018-02-16 | End: 2018-02-16

## 2018-02-03 NOTE — H&P PST ADULT - NEGATIVE MUSCULOSKELETAL SYMPTOMS
no leg pain R/no muscle cramps/no arm pain L/no arm pain R/no muscle weakness/no leg pain L no muscle cramps/no stiffness/no muscle weakness/no back pain/no neck pain/no arm pain L/no arm pain R/no leg pain R/no leg pain L

## 2018-02-03 NOTE — H&P PST ADULT - NSANTHOSAYNRD_GEN_A_CORE
No. DAYDAY screening performed.  STOP BANG Legend: 0-2 = LOW Risk; 3-4 = INTERMEDIATE Risk; 5-8 = HIGH Risk

## 2018-02-03 NOTE — H&P PST ADULT - GASTROINTESTINAL DETAILS
soft/no organomegaly/no rebound tenderness/nontender/no guarding/no masses palpable/bowel sounds normal/no distention

## 2018-02-03 NOTE — H&P PST ADULT - NEGATIVE GENERAL GENITOURINARY SYMPTOMS
no urinary hesitancy/no dysuria/no hematuria/normal urinary frequency/no bladder infections/no renal colic

## 2018-02-03 NOTE — H&P PST ADULT - FAMILY HISTORY
Father  Still living? Yes, Estimated age: Age Unknown  Family history of diabetes mellitus (DM), Age at diagnosis: Age Unknown     Mother  Still living? Yes, Estimated age: Age Unknown  Family history of hypertension in mother, Age at diagnosis: Age Unknown  Family history of hypothyroidism, Age at diagnosis: Age Unknown

## 2018-02-03 NOTE — H&P PST ADULT - NEGATIVE CARDIOVASCULAR SYMPTOMS
no orthopnea/no paroxysmal nocturnal dyspnea/no palpitations/no claudication/no peripheral edema/no chest pain/no dyspnea on exertion

## 2018-02-03 NOTE — H&P PST ADULT - PROBLEM SELECTOR PLAN 1
scheduled for laparoscopic salpingectomy on 02/16/18  pending labs, surgical scrub & preop instructions given & explained, pt verbalized understanding scheduled for laparoscopic bilateral salpingectomy on 02/16/18  pending labs, surgical scrub & preop instructions given & explained, pt verbalized understanding  instructed to bring urine sample am of surgery for UCG

## 2018-02-03 NOTE — H&P PST ADULT - EAR CANAL R
inflamed walls/pt states she cleaned ears this am, wax noted pressed to sides, no c/o pain or drainage noted

## 2018-02-03 NOTE — H&P PST ADULT - NEUROLOGICAL DETAILS
cranial nerves intact/normal strength/no spontaneous movement/sensation intact/responds to pain/responds to verbal commands/alert and oriented x 3

## 2018-02-03 NOTE — H&P PST ADULT - PMH
No pertinent past medical history    Obesity    Vaginal bleeding Murmur, heart  Funtional  Obesity    Vaginal bleeding Murmur, heart  Functional  Obesity    Vaginal bleeding

## 2018-02-03 NOTE — H&P PST ADULT - PSH
H/O reduction mammoplasty  2011  History of arthroscopy of both shoulders  left  x 2 - 2016  & 2016  History of  X 2  2000  repair of right pinky-tendon  2006  ? flexor tendon  S/P tooth extraction

## 2018-02-15 NOTE — ASU PATIENT PROFILE, ADULT - NS TRANSFER HEARING AID
[id number]

## 2018-02-16 ENCOUNTER — APPOINTMENT (OUTPATIENT)
Dept: OBGYN | Facility: HOSPITAL | Age: 38
End: 2018-02-16

## 2018-02-16 ENCOUNTER — OUTPATIENT (OUTPATIENT)
Dept: OUTPATIENT SERVICES | Facility: HOSPITAL | Age: 38
LOS: 1 days | Discharge: HOME | End: 2018-02-16
Payer: MEDICAID

## 2018-02-16 ENCOUNTER — RESULT REVIEW (OUTPATIENT)
Age: 38
End: 2018-02-16

## 2018-02-16 VITALS
HEART RATE: 61 BPM | DIASTOLIC BLOOD PRESSURE: 71 MMHG | TEMPERATURE: 99 F | RESPIRATION RATE: 14 BRPM | OXYGEN SATURATION: 99 % | SYSTOLIC BLOOD PRESSURE: 121 MMHG | HEIGHT: 65 IN | WEIGHT: 192.02 LBS

## 2018-02-16 VITALS
SYSTOLIC BLOOD PRESSURE: 138 MMHG | OXYGEN SATURATION: 100 % | HEART RATE: 65 BPM | RESPIRATION RATE: 14 BRPM | DIASTOLIC BLOOD PRESSURE: 84 MMHG

## 2018-02-16 DIAGNOSIS — Z98.890 OTHER SPECIFIED POSTPROCEDURAL STATES: Chronic | ICD-10-CM

## 2018-02-16 DIAGNOSIS — K08.409 PARTIAL LOSS OF TEETH, UNSPECIFIED CAUSE, UNSPECIFIED CLASS: Chronic | ICD-10-CM

## 2018-02-16 DIAGNOSIS — Z30.09 ENCOUNTER FOR OTHER GENERAL COUNSELING AND ADVICE ON CONTRACEPTION: ICD-10-CM

## 2018-02-16 PROCEDURE — 58661 LAPAROSCOPY REMOVE ADNEXA: CPT | Mod: GC

## 2018-02-16 PROCEDURE — 88302 TISSUE EXAM BY PATHOLOGIST: CPT | Mod: 26

## 2018-02-16 RX ORDER — SODIUM CHLORIDE 9 MG/ML
1000 INJECTION, SOLUTION INTRAVENOUS
Qty: 0 | Refills: 0 | Status: DISCONTINUED | OUTPATIENT
Start: 2018-02-16 | End: 2018-02-17

## 2018-02-16 RX ORDER — IBUPROFEN 200 MG
600 TABLET ORAL EVERY 6 HOURS
Qty: 0 | Refills: 0 | Status: DISCONTINUED | OUTPATIENT
Start: 2018-02-16 | End: 2018-02-17

## 2018-02-16 RX ORDER — NORETHINDRONE AND ETHINYL ESTRADIOL 0.4-0.035
1 KIT ORAL
Qty: 0 | Refills: 0 | COMMUNITY

## 2018-02-16 RX ORDER — IBUPROFEN 200 MG
1 TABLET ORAL
Qty: 0 | Refills: 0 | DISCHARGE
Start: 2018-02-16

## 2018-02-16 RX ORDER — ACETAMINOPHEN 500 MG
975 TABLET ORAL EVERY 6 HOURS
Qty: 0 | Refills: 0 | Status: DISCONTINUED | OUTPATIENT
Start: 2018-02-16 | End: 2018-02-17

## 2018-02-16 RX ORDER — ACETAMINOPHEN 500 MG
3 TABLET ORAL
Qty: 0 | Refills: 0 | DISCHARGE
Start: 2018-02-16

## 2018-02-16 RX ORDER — OXYCODONE HYDROCHLORIDE 5 MG/1
1 TABLET ORAL
Qty: 4 | Refills: 0
Start: 2018-02-16 | End: 2018-02-16

## 2018-02-16 RX ORDER — DIPHENHYDRAMINE HCL 50 MG
1 CAPSULE ORAL
Qty: 0 | Refills: 0 | COMMUNITY

## 2018-02-16 RX ADMIN — SODIUM CHLORIDE 30 MILLILITER(S): 9 INJECTION, SOLUTION INTRAVENOUS at 07:20

## 2018-02-16 NOTE — ASU DISCHARGE PLAN (ADULT/PEDIATRIC). - ACTIVITY LEVEL
Nothing in vagina, no intercourse, no douching, no tampons, no tub baths, and no swimming for about 2 weeks or until cleared by MD. Contact your doctor if you are soaking a pad every hour, experience foul smelling discharge, or are noticing blood clots dime-sized or the size of your fist on your pad./no sports/gym/no intercourse/no exercise/no heavy lifting/no tampons/nothing per vagina/no tub baths/no douching

## 2018-02-16 NOTE — ASU DISCHARGE PLAN (ADULT/PEDIATRIC). - NOTIFY
Persistent Nausea and Vomiting/Unable to Urinate/Pain not relieved by Medications/GYN Fever>100.4/Signs of infection: redness around surgical site, hot and tender to the touch, pus drainage of any kind accompanied by foul odor/Bleeding that does not stop

## 2018-02-16 NOTE — BRIEF OPERATIVE NOTE - PROCEDURE
<<-----Click on this checkbox to enter Procedure Salpingectomy, bilateral, laparoscopic  02/16/2018    Active  [id number]

## 2018-02-16 NOTE — ASU DISCHARGE PLAN (ADULT/PEDIATRIC). - NURSING INSTRUCTIONS
You were given IV Tylenol for pain management in the Operating Room.  Please NOT take tylenol/acetaminophen products for the next 6-8 hours (after  215PM ).  You were given Toradol for pain management in the Operating Room. Please do NOT take Ibuprofen (NSAIDS) products (Motrin, Aleve, Advil, Excedrin) for the next 6-8 hours (After 215PM).  Refer to medication reconcillation sheet attached with discharge instruction paperwork.

## 2018-02-16 NOTE — ASU DISCHARGE PLAN (ADULT/PEDIATRIC). - MEDICATION SUMMARY - MEDICATIONS TO STOP TAKING
I will STOP taking the medications listed below when I get home from the hospital:    Junel 1/20 oral tablet  -- 1 tab(s) by mouth once a day    Benadryl 25 mg oral tablet  -- 1 tab(s) by mouth 3 times a day, As Needed

## 2018-02-16 NOTE — ASU DISCHARGE PLAN (ADULT/PEDIATRIC). - MEDICATION SUMMARY - MEDICATIONS TO TAKE
I will START or STAY ON the medications listed below when I get home from the hospital:    acetaminophen 325 mg oral tablet  -- 3 tab(s) by mouth every 6 hours  -- Indication: For Pain    ibuprofen 600 mg oral tablet  -- 1 tab(s) by mouth every 6 hours  -- Indication: For Pain    oxyCODONE 5 mg oral tablet  -- 1 tab(s) by mouth every 6 hours PRN severe pain MDD:4  -- Caution federal law prohibits the transfer of this drug to any person other  than the person for whom it was prescribed.  It is very important that you take or use this exactly as directed.  Do not skip doses or discontinue unless directed by your doctor.  May cause drowsiness.  Alcohol may intensify this effect.  Use care when operating dangerous machinery.  This prescription cannot be refilled.  Using more of this medication than prescribed may cause serious breathing problems.    -- Indication: For Severe pain

## 2018-02-21 ENCOUNTER — TRANSCRIPTION ENCOUNTER (OUTPATIENT)
Age: 38
End: 2018-02-21

## 2018-02-21 LAB — SURGICAL PATHOLOGY STUDY: SIGNIFICANT CHANGE UP

## 2018-03-01 ENCOUNTER — OUTPATIENT (OUTPATIENT)
Dept: OUTPATIENT SERVICES | Facility: HOSPITAL | Age: 38
LOS: 1 days | End: 2018-03-01
Payer: MEDICAID

## 2018-03-01 DIAGNOSIS — K08.409 PARTIAL LOSS OF TEETH, UNSPECIFIED CAUSE, UNSPECIFIED CLASS: Chronic | ICD-10-CM

## 2018-03-01 DIAGNOSIS — Z98.890 OTHER SPECIFIED POSTPROCEDURAL STATES: Chronic | ICD-10-CM

## 2018-03-01 PROCEDURE — G9001: CPT

## 2018-03-02 ENCOUNTER — APPOINTMENT (OUTPATIENT)
Dept: OBGYN | Facility: CLINIC | Age: 38
End: 2018-03-02
Payer: MEDICAID

## 2018-03-02 VITALS
HEIGHT: 65 IN | HEART RATE: 60 BPM | SYSTOLIC BLOOD PRESSURE: 145 MMHG | WEIGHT: 192 LBS | BODY MASS INDEX: 31.99 KG/M2 | DIASTOLIC BLOOD PRESSURE: 79 MMHG

## 2018-03-02 LAB
BILIRUB UR QL STRIP: NORMAL
GLUCOSE UR-MCNC: NORMAL
HCG UR QL: 0.2 EU/DL
HGB UR QL STRIP.AUTO: NORMAL
KETONES UR-MCNC: NORMAL
LEUKOCYTE ESTERASE UR QL STRIP: NORMAL
NITRITE UR QL STRIP: NORMAL
PH UR STRIP: 7
PROT UR STRIP-MCNC: NORMAL
SP GR UR STRIP: 1.01

## 2018-03-02 PROCEDURE — 99213 OFFICE O/P EST LOW 20 MIN: CPT

## 2018-03-02 RX ORDER — CLINDAMYCIN HYDROCHLORIDE 150 MG/1
150 CAPSULE ORAL
Qty: 28 | Refills: 0 | Status: DISCONTINUED | COMMUNITY
Start: 2017-10-05

## 2018-03-02 RX ORDER — OXYCODONE 5 MG/1
5 TABLET ORAL
Qty: 4 | Refills: 0 | Status: DISCONTINUED | COMMUNITY
Start: 2018-02-16

## 2018-03-04 LAB — BACTERIA UR CULT: NORMAL

## 2018-03-06 DIAGNOSIS — R69 ILLNESS, UNSPECIFIED: ICD-10-CM

## 2018-05-22 ENCOUNTER — EMERGENCY (EMERGENCY)
Facility: HOSPITAL | Age: 38
LOS: 1 days | Discharge: HOME | End: 2018-05-22
Admitting: EMERGENCY MEDICINE
Payer: MEDICAID

## 2018-05-22 VITALS
TEMPERATURE: 98 F | RESPIRATION RATE: 68 BRPM | OXYGEN SATURATION: 100 % | DIASTOLIC BLOOD PRESSURE: 80 MMHG | HEART RATE: 65 BPM | SYSTOLIC BLOOD PRESSURE: 157 MMHG

## 2018-05-22 DIAGNOSIS — Z98.890 OTHER SPECIFIED POSTPROCEDURAL STATES: Chronic | ICD-10-CM

## 2018-05-22 DIAGNOSIS — K08.409 PARTIAL LOSS OF TEETH, UNSPECIFIED CAUSE, UNSPECIFIED CLASS: Chronic | ICD-10-CM

## 2018-05-22 DIAGNOSIS — Z98.51 TUBAL LIGATION STATUS: Chronic | ICD-10-CM

## 2018-05-22 PROCEDURE — 99283 EMERGENCY DEPT VISIT LOW MDM: CPT

## 2018-05-22 RX ORDER — OXYCODONE HYDROCHLORIDE 5 MG/1
1 TABLET ORAL
Qty: 8 | Refills: 0
Start: 2018-05-22 | End: 2018-05-23

## 2018-05-22 RX ORDER — OXYCODONE AND ACETAMINOPHEN 5; 325 MG/1; MG/1
1 TABLET ORAL ONCE
Qty: 0 | Refills: 0 | Status: DISCONTINUED | OUTPATIENT
Start: 2018-05-22 | End: 2018-05-22

## 2018-05-22 RX ORDER — CHLORHEXIDINE GLUCONATE 213 G/1000ML
15 SOLUTION TOPICAL
Qty: 1 | Refills: 0
Start: 2018-05-22 | End: 2018-06-04

## 2018-05-22 RX ADMIN — OXYCODONE AND ACETAMINOPHEN 1 TABLET(S): 5; 325 TABLET ORAL at 18:44

## 2018-05-22 NOTE — ED PROVIDER NOTE - PLAN OF CARE
Advance activity as tolerated.  Continue all previously prescribed medications as directed unless otherwise instructed.  Take Clindamycin three times a day.  Use Peridex mouth was as prescribed.  Take Motrin (also sold as Advil or Ibuprofen) 400-600 mg (two or three 200 mg over the counter pills) every 8 hours as needed for moderate pain -- take with food. Take Percocet 325/5 once every 6 hours as needed for severe pain -- causes drowsiness; DO NOT drink alcohol, drive, or operate heavy machinery with this medication.  Caution federal law prohibits the transfer of this drug to any person other  than the person for whom it was prescribed. May cause drowsiness.  Alcohol may intensify this effect.  Use care when operating dangerous machinery.  This prescription cannot be refilled. Using more of this medication than prescribed may cause serious breathing problems Follow up with your primary care physician in 48-72 hours- bring copies of your results.  Return to the ER for worsening or persistent symptoms, including but not limited to fevers, worsening/persistent pain/swelling, facial swelling, headaches and/or ANY NEW OR CONCERNING SYMPTOMS. If you have issues obtaining follow up, please call: [phone] to obtain a doctor or specialist who takes your insurance in your area.  You may call [phone] to make an appointment with the internal medicine clinic.

## 2018-05-22 NOTE — ED PROVIDER NOTE - OBJECTIVE STATEMENT
Pt is a 38 y/o F nonsmoker PMHx migraines, h/o tubal ligation,  x 2 p/w dental pain x 4 days.  Pt notes gradual onset pain to left maxillary central incisor associated with gradual onset swelling and redness to gingiva surrounding tooth. Pt notes pain worsens with brushing teeth and chewing.  Mildly improves with IBU; last dose was 3 hrs ago.  Pt states she has an appointment with a dentist in a few days, but presents to ED requesting pain control.  Contrary to triage note, pt denies headache.  Denies any fevers, chills, difficulty swallowing, trauma, purulent drainage, illicit drug use.

## 2018-05-22 NOTE — ED PROVIDER NOTE - PROGRESS NOTE DETAILS
SILVA TODD:  Pt requesting to go home.  Pt refusing to see dentist today.  Electing to follow up with dentist on outpatient basis.  Pt medically stable for discharge.

## 2018-05-22 NOTE — ED PROVIDER NOTE - CARE PLAN
Principal Discharge DX:	Gingival disease  Assessment and plan of treatment:	Advance activity as tolerated.  Continue all previously prescribed medications as directed unless otherwise instructed.  Take Clindamycin three times a day.  Use Peridex mouth was as prescribed.  Take Motrin (also sold as Advil or Ibuprofen) 400-600 mg (two or three 200 mg over the counter pills) every 8 hours as needed for moderate pain -- take with food. Take Percocet 325/5 once every 6 hours as needed for severe pain -- causes drowsiness; DO NOT drink alcohol, drive, or operate heavy machinery with this medication.  Caution federal law prohibits the transfer of this drug to any person other  than the person for whom it was prescribed. May cause drowsiness.  Alcohol may intensify this effect.  Use care when operating dangerous machinery.  This prescription cannot be refilled. Using more of this medication than prescribed may cause serious breathing problems Follow up with your primary care physician in 48-72 hours- bring copies of your results.  Return to the ER for worsening or persistent symptoms, including but not limited to fevers, worsening/persistent pain/swelling, facial swelling, headaches and/or ANY NEW OR CONCERNING SYMPTOMS. If you have issues obtaining follow up, please call: [phone] to obtain a doctor or specialist who takes your insurance in your area.  You may call [phone] to make an appointment with the internal medicine clinic.

## 2018-05-22 NOTE — ED PROVIDER NOTE - PSH
H/O reduction mammoplasty  2011  H/O tubal ligation    History of arthroscopy of both shoulders  left  x 2 - 2016  & 2016  History of  X 2  2000  repair of right pinky-tendon    ? flexor tendon  S/P tooth extraction

## 2018-05-22 NOTE — ED PROVIDER NOTE - MEDICAL DECISION MAKING DETAILS
Pt is a 36 y/o F nonsmoker PMHx migraines, h/o tubal ligation,  x 2 p/w dental pain x 4 days -- likely gingival disease, no e/o periapical abscess; no ulcerations/lesions -- pain control, antibiotics, outpatient dental follow up

## 2018-05-22 NOTE — ED ADULT TRIAGE NOTE - CHIEF COMPLAINT QUOTE
Pt c/o swelling and pain to upper front tooth are since Friday , and headache since today.  Pt took tylenol at 12, and ibuprofen at 3pm

## 2018-05-22 NOTE — ED PROVIDER NOTE - TOOTH FINDINGS
+minimal swelling and erythema to gingiva surrounding left maxillary central incisor left maxillary central incisor not mobile; no percussion tenderness; +minimal swelling and erythema to gingiva surrounding left maxillary central incisor

## 2018-05-22 NOTE — ED PROVIDER NOTE - CHPI ED SYMPTOMS NEG
no mouth sores/no decreased eating/drinking/no ear pain/no bleeding gums/no fever/no headache/no nasal congestion

## 2018-07-17 PROBLEM — R01.1 CARDIAC MURMUR, UNSPECIFIED: Chronic | Status: ACTIVE | Noted: 2018-02-03

## 2018-08-09 ENCOUNTER — CLINICAL ADVICE (OUTPATIENT)
Age: 38
End: 2018-08-09

## 2018-08-09 DIAGNOSIS — N94.89 OTHER SPECIFIED CONDITIONS ASSOCIATED WITH FEMALE GENITAL ORGANS AND MENSTRUAL CYCLE: ICD-10-CM

## 2018-09-04 DIAGNOSIS — Z86.718 PERSONAL HISTORY OF OTHER VENOUS THROMBOSIS AND EMBOLISM: ICD-10-CM

## 2018-09-06 ENCOUNTER — RX RENEWAL (OUTPATIENT)
Age: 38
End: 2018-09-06

## 2018-09-12 ENCOUNTER — EMERGENCY (EMERGENCY)
Facility: HOSPITAL | Age: 38
LOS: 1 days | Discharge: HOME | End: 2018-09-12
Attending: EMERGENCY MEDICINE | Admitting: EMERGENCY MEDICINE
Payer: COMMERCIAL

## 2018-09-12 VITALS
RESPIRATION RATE: 16 BRPM | OXYGEN SATURATION: 97 % | HEART RATE: 73 BPM | DIASTOLIC BLOOD PRESSURE: 95 MMHG | TEMPERATURE: 98 F | SYSTOLIC BLOOD PRESSURE: 170 MMHG

## 2018-09-12 DIAGNOSIS — Z98.890 OTHER SPECIFIED POSTPROCEDURAL STATES: Chronic | ICD-10-CM

## 2018-09-12 DIAGNOSIS — K08.409 PARTIAL LOSS OF TEETH, UNSPECIFIED CAUSE, UNSPECIFIED CLASS: Chronic | ICD-10-CM

## 2018-09-12 DIAGNOSIS — Z98.51 TUBAL LIGATION STATUS: Chronic | ICD-10-CM

## 2018-09-12 PROCEDURE — 93010 ELECTROCARDIOGRAM REPORT: CPT

## 2018-09-12 PROCEDURE — 99284 EMERGENCY DEPT VISIT MOD MDM: CPT | Mod: 25

## 2018-09-12 NOTE — ED ADULT TRIAGE NOTE - CHIEF COMPLAINT QUOTE
Pt w/ hx of left leg DVT on xarelto x 3 weeks c/o non-reproducible left sided chest pain radiating to left shoulder worse when laying flat with associated intermittent FAUST x 2 days.  Pt ambulates on crutches  recommended by pmd to keep weight off left leg.  EKG in progress.

## 2018-09-13 VITALS
OXYGEN SATURATION: 100 % | RESPIRATION RATE: 16 BRPM | HEART RATE: 68 BPM | TEMPERATURE: 98 F | SYSTOLIC BLOOD PRESSURE: 164 MMHG | DIASTOLIC BLOOD PRESSURE: 96 MMHG

## 2018-09-13 PROBLEM — N93.9 ABNORMAL UTERINE AND VAGINAL BLEEDING, UNSPECIFIED: Chronic | Status: ACTIVE | Noted: 2017-11-15

## 2018-09-13 LAB
ALBUMIN SERPL ELPH-MCNC: 4.2 G/DL — SIGNIFICANT CHANGE UP (ref 3.3–5)
ALP SERPL-CCNC: 45 U/L — SIGNIFICANT CHANGE UP (ref 40–120)
ALT FLD-CCNC: 4 U/L — SIGNIFICANT CHANGE UP (ref 4–33)
ANISOCYTOSIS BLD QL: SLIGHT — SIGNIFICANT CHANGE UP
APTT BLD: 42.2 SEC — HIGH (ref 27.5–37.4)
AST SERPL-CCNC: 12 U/L — SIGNIFICANT CHANGE UP (ref 4–32)
BASOPHILS # BLD AUTO: 0.07 K/UL — SIGNIFICANT CHANGE UP (ref 0–0.2)
BASOPHILS NFR BLD AUTO: 1.4 % — SIGNIFICANT CHANGE UP (ref 0–2)
BASOPHILS NFR SPEC: 0 % — SIGNIFICANT CHANGE UP (ref 0–2)
BILIRUB SERPL-MCNC: < 0.2 MG/DL — LOW (ref 0.2–1.2)
BLASTS # FLD: 0 % — SIGNIFICANT CHANGE UP (ref 0–0)
BUN SERPL-MCNC: 10 MG/DL — SIGNIFICANT CHANGE UP (ref 7–23)
CALCIUM SERPL-MCNC: 9.4 MG/DL — SIGNIFICANT CHANGE UP (ref 8.4–10.5)
CHLORIDE SERPL-SCNC: 102 MMOL/L — SIGNIFICANT CHANGE UP (ref 98–107)
CO2 SERPL-SCNC: 26 MMOL/L — SIGNIFICANT CHANGE UP (ref 22–31)
CREAT SERPL-MCNC: 0.69 MG/DL — SIGNIFICANT CHANGE UP (ref 0.5–1.3)
EOSINOPHIL # BLD AUTO: 0.11 K/UL — SIGNIFICANT CHANGE UP (ref 0–0.5)
EOSINOPHIL NFR BLD AUTO: 2.2 % — SIGNIFICANT CHANGE UP (ref 0–6)
EOSINOPHIL NFR FLD: 3.3 % — SIGNIFICANT CHANGE UP (ref 0–6)
GIANT PLATELETS BLD QL SMEAR: PRESENT — SIGNIFICANT CHANGE UP
GLUCOSE SERPL-MCNC: 112 MG/DL — HIGH (ref 70–99)
HCG SERPL-ACNC: < 5 MIU/ML — SIGNIFICANT CHANGE UP
HCT VFR BLD CALC: 29.4 % — LOW (ref 34.5–45)
HGB BLD-MCNC: 9 G/DL — LOW (ref 11.5–15.5)
HYPOCHROMIA BLD QL: SLIGHT — SIGNIFICANT CHANGE UP
IMM GRANULOCYTES # BLD AUTO: 0.01 # — SIGNIFICANT CHANGE UP
IMM GRANULOCYTES NFR BLD AUTO: 0.2 % — SIGNIFICANT CHANGE UP (ref 0–1.5)
INR BLD: 1.66 — HIGH (ref 0.88–1.17)
LYMPHOCYTES # BLD AUTO: 1.76 K/UL — SIGNIFICANT CHANGE UP (ref 1–3.3)
LYMPHOCYTES # BLD AUTO: 34.6 % — SIGNIFICANT CHANGE UP (ref 13–44)
LYMPHOCYTES NFR SPEC AUTO: 36.7 % — SIGNIFICANT CHANGE UP (ref 13–44)
MCHC RBC-ENTMCNC: 21.4 PG — LOW (ref 27–34)
MCHC RBC-ENTMCNC: 30.6 % — LOW (ref 32–36)
MCV RBC AUTO: 70 FL — LOW (ref 80–100)
METAMYELOCYTES # FLD: 0 % — SIGNIFICANT CHANGE UP (ref 0–1)
MICROCYTES BLD QL: SIGNIFICANT CHANGE UP
MONOCYTES # BLD AUTO: 0.63 K/UL — SIGNIFICANT CHANGE UP (ref 0–0.9)
MONOCYTES NFR BLD AUTO: 12.4 % — SIGNIFICANT CHANGE UP (ref 2–14)
MONOCYTES NFR BLD: 26.7 % — HIGH (ref 2–9)
MYELOCYTES NFR BLD: 0 % — SIGNIFICANT CHANGE UP (ref 0–0)
NEUTROPHIL AB SER-ACNC: 33.3 % — LOW (ref 43–77)
NEUTROPHILS # BLD AUTO: 2.5 K/UL — SIGNIFICANT CHANGE UP (ref 1.8–7.4)
NEUTROPHILS NFR BLD AUTO: 49.2 % — SIGNIFICANT CHANGE UP (ref 43–77)
NEUTS BAND # BLD: 0 % — SIGNIFICANT CHANGE UP (ref 0–6)
NRBC # FLD: 0 — SIGNIFICANT CHANGE UP
OTHER - HEMATOLOGY %: 0 — SIGNIFICANT CHANGE UP
OVALOCYTES BLD QL SMEAR: SIGNIFICANT CHANGE UP
PLATELET # BLD AUTO: 249 K/UL — SIGNIFICANT CHANGE UP (ref 150–400)
PLATELET COUNT - ESTIMATE: NORMAL — SIGNIFICANT CHANGE UP
PMV BLD: 12.9 FL — SIGNIFICANT CHANGE UP (ref 7–13)
POIKILOCYTOSIS BLD QL AUTO: SLIGHT — SIGNIFICANT CHANGE UP
POTASSIUM SERPL-MCNC: 3.8 MMOL/L — SIGNIFICANT CHANGE UP (ref 3.5–5.3)
POTASSIUM SERPL-SCNC: 3.8 MMOL/L — SIGNIFICANT CHANGE UP (ref 3.5–5.3)
PROMYELOCYTES # FLD: 0 % — SIGNIFICANT CHANGE UP (ref 0–0)
PROT SERPL-MCNC: 7.6 G/DL — SIGNIFICANT CHANGE UP (ref 6–8.3)
PROTHROM AB SERPL-ACNC: 18.6 SEC — HIGH (ref 9.8–13.1)
RBC # BLD: 4.2 M/UL — SIGNIFICANT CHANGE UP (ref 3.8–5.2)
RBC # FLD: 16.9 % — HIGH (ref 10.3–14.5)
SODIUM SERPL-SCNC: 138 MMOL/L — SIGNIFICANT CHANGE UP (ref 135–145)
TARGETS BLD QL SMEAR: SIGNIFICANT CHANGE UP
TROPONIN T, HIGH SENSITIVITY: < 6 NG/L — SIGNIFICANT CHANGE UP (ref ?–14)
VARIANT LYMPHS # BLD: 0 % — SIGNIFICANT CHANGE UP
WBC # BLD: 5.08 K/UL — SIGNIFICANT CHANGE UP (ref 3.8–10.5)
WBC # FLD AUTO: 5.08 K/UL — SIGNIFICANT CHANGE UP (ref 3.8–10.5)

## 2018-09-13 PROCEDURE — 71275 CT ANGIOGRAPHY CHEST: CPT | Mod: 26

## 2018-09-13 RX ORDER — ACETAMINOPHEN 500 MG
975 TABLET ORAL ONCE
Qty: 0 | Refills: 0 | Status: COMPLETED | OUTPATIENT
Start: 2018-09-13 | End: 2018-09-13

## 2018-09-13 RX ADMIN — Medication 975 MILLIGRAM(S): at 03:36

## 2018-09-13 NOTE — ED PROVIDER NOTE - MEDICAL DECISION MAKING DETAILS
38F, no med hx, recent dx DVT now on xarelto, presents w chief complaint of 2-3 days of left sided pleuritic chest pain, associated with shortness of breath and dyspnea on exertion. High suspicion for PE. Low clinical suspicion for MSK/ACS/infectious. Will obtain EKG. Labs. CTA to eval for PE. Currently stable, no acute distress. Will continue to follow up and re-assess. Case discussed with Attending: Susan Mo MD, PGY2 Emergency Medicine

## 2018-09-13 NOTE — ED PROVIDER NOTE - OBJECTIVE STATEMENT
38F, no med hx, presents w chief complaint of 2-3 days of left sided pleuritic chest pain, associated with shortness of breath and dyspnea on exertion. Pain is sharp, worse with inspiration, movement, lying flat. No fevers or chills, nausea or vomiting, headache, dizziness, blurry vision, lightheadedness, abdominal pain, cough, congestion. Patient was recently diagnosed 3 weeks ago with left lower extremity DVT and started on xarelto. Patient states she flew to [place] on Aug 12, developed left lower ext pain/swelling Aug 16, [place] back [place] Aug 17. Of note, also started OCP use prior to [place] trip. Patient went to Urgent Care upon return to  and was told she had no DVT on U/S. She went to Orthopedist on Aug 27 and was dx with DVT on MRI of the left lower extremity. Patient was discharged home on Xarelto and crutches. Her ongoing symptoms began Monday, as described above.  No other medications other than xarelto. Allergic to shellfish, no contrast allergy known. Non smoker. No hx DVT/PE in past.

## 2018-09-13 NOTE — ED ADULT NURSE NOTE - OBJECTIVE STATEMENT
pt received alert and oriented x3. pt was recently dx with left leg DVT 3 weeks ago and on Xarelto. pt now c.o having left sided chest pain radiating  to left shoulder associated with SOB and intermittent dizziness. respirations equal and unlabored. 100% on room air. pt left leg has compression stockings on and is slight swollen. pt amb with crutches. Pt placed on continuous tele monitoring sinus travis on cm. . vss afebrile.20g placed in right a/c .labs drawn and sent. md at bedside for eval.  will continue to monitor.

## 2018-09-13 NOTE — ED PROVIDER NOTE - PROGRESS NOTE DETAILS
CTA w/o PE. Spoke to patient extensively, will d/c home with PCP f/u and strict return precautions  Ravinder Mo MD, PGY2 Emergency Medicine

## 2018-09-13 NOTE — ED ADULT NURSE NOTE - NSIMPLEMENTINTERV_GEN_ALL_ED
Implemented All Fall with Harm Risk Interventions:  [place] to call system. Call bell, personal items and telephone within reach. Instruct patient to call for assistance. Room bathroom lighting operational. Non-slip footwear when patient is off stretcher. Physically safe environment: no spills, clutter or unnecessary equipment. Stretcher in lowest position, wheels locked, appropriate side rails in place. Provide visual cue, wrist band, yellow gown, etc. Monitor gait and stability. Monitor for mental status changes and reorient to person, place, and time. Review medications for side effects contributing to fall risk. Reinforce activity limits and safety measures with patient and family. Provide visual clues: red socks.

## 2018-09-13 NOTE — ED PROVIDER NOTE - ATTENDING CONTRIBUTION TO CARE
MD Davis:  I performed a face to face bedside interview with patient regarding history of present illness, review of symptoms and past medical history. I completed an independent physical exam(documented below).  I have discussed patient's plan of care with resident.   I agree with note as stated above, having amended the EMR as needed to reflect my findings. I have discussed the assessment and plan of care.  This includes during the time I functioned as the attending physician for this patient.  PE:  Gen: Alert, NAD  Head: NC, AT,  EOMI, normal lids/conjunctiva  ENT:  normal hearing, patent oropharynx without erythema/exudate  Neck: +supple, no tenderness/meningismus/JVD, +Trachea midline  Chest: no chest wall tenderness, equal chest rise  Pulm: Bilateral BS, normal resp effort, no wheeze/stridor/retractions  CV: RRR, no M/R/G, +dist pulses  Abd: +BS, soft, NT/ND  Rectal: deferred  Mskel: no edema/erythema/cyanosis, LLE in compression stocking, +ttp proximal posterior calf  Skin: no rash  Neuro: AAOx3  MDM:   39yo F, s/p recent diagnosis of DVT of LLE, on xarelto, c/o 3 days of pleuritic cp associated w/ sob as well as FAUST. States she has been compliant w/ xarelto. +family hx of heart dx, but not a smoker, no htn or DM. Ddx includes PE vs deconditioning (pt injured Left knee when she fell in pool last month therefore has been mostly sedentary and ambulating with crutches), ACS less likely. Labs, CTA chest.

## 2018-10-23 ENCOUNTER — EMERGENCY (EMERGENCY)
Facility: HOSPITAL | Age: 38
LOS: 1 days | Discharge: HOME | End: 2018-10-23
Attending: EMERGENCY MEDICINE | Admitting: EMERGENCY MEDICINE
Payer: COMMERCIAL

## 2018-10-23 VITALS
DIASTOLIC BLOOD PRESSURE: 72 MMHG | TEMPERATURE: 98 F | SYSTOLIC BLOOD PRESSURE: 127 MMHG | OXYGEN SATURATION: 100 % | RESPIRATION RATE: 18 BRPM | HEART RATE: 63 BPM

## 2018-10-23 VITALS
TEMPERATURE: 98 F | OXYGEN SATURATION: 100 % | HEART RATE: 62 BPM | RESPIRATION RATE: 18 BRPM | SYSTOLIC BLOOD PRESSURE: 118 MMHG | DIASTOLIC BLOOD PRESSURE: 81 MMHG

## 2018-10-23 DIAGNOSIS — K08.409 PARTIAL LOSS OF TEETH, UNSPECIFIED CAUSE, UNSPECIFIED CLASS: Chronic | ICD-10-CM

## 2018-10-23 DIAGNOSIS — Z98.890 OTHER SPECIFIED POSTPROCEDURAL STATES: Chronic | ICD-10-CM

## 2018-10-23 DIAGNOSIS — Z98.51 TUBAL LIGATION STATUS: Chronic | ICD-10-CM

## 2018-10-23 LAB
ALBUMIN SERPL ELPH-MCNC: 4 G/DL — SIGNIFICANT CHANGE UP (ref 3.3–5)
ALP SERPL-CCNC: 54 U/L — SIGNIFICANT CHANGE UP (ref 40–120)
ALT FLD-CCNC: 5 U/L — SIGNIFICANT CHANGE UP (ref 4–33)
ANISOCYTOSIS BLD QL: SLIGHT — SIGNIFICANT CHANGE UP
APTT BLD: 39.8 SEC — HIGH (ref 27.5–37.4)
AST SERPL-CCNC: 13 U/L — SIGNIFICANT CHANGE UP (ref 4–32)
BASOPHILS # BLD AUTO: 0.07 K/UL — SIGNIFICANT CHANGE UP (ref 0–0.2)
BASOPHILS NFR BLD AUTO: 1.8 % — SIGNIFICANT CHANGE UP (ref 0–2)
BILIRUB SERPL-MCNC: 0.4 MG/DL — SIGNIFICANT CHANGE UP (ref 0.2–1.2)
BLD GP AB SCN SERPL QL: NEGATIVE — SIGNIFICANT CHANGE UP
BUN SERPL-MCNC: 8 MG/DL — SIGNIFICANT CHANGE UP (ref 7–23)
CALCIUM SERPL-MCNC: 8.6 MG/DL — SIGNIFICANT CHANGE UP (ref 8.4–10.5)
CHLORIDE SERPL-SCNC: 103 MMOL/L — SIGNIFICANT CHANGE UP (ref 98–107)
CO2 SERPL-SCNC: 22 MMOL/L — SIGNIFICANT CHANGE UP (ref 22–31)
CREAT SERPL-MCNC: 0.64 MG/DL — SIGNIFICANT CHANGE UP (ref 0.5–1.3)
EOSINOPHIL # BLD AUTO: 0.07 K/UL — SIGNIFICANT CHANGE UP (ref 0–0.5)
EOSINOPHIL NFR BLD AUTO: 1.8 % — SIGNIFICANT CHANGE UP (ref 0–6)
GIANT PLATELETS BLD QL SMEAR: PRESENT — SIGNIFICANT CHANGE UP
GLUCOSE SERPL-MCNC: 103 MG/DL — HIGH (ref 70–99)
HCG SERPL-ACNC: < 5 MIU/ML — SIGNIFICANT CHANGE UP
HCT VFR BLD CALC: 31.6 % — LOW (ref 34.5–45)
HCT VFR BLD CALC: 33.4 % — LOW (ref 34.5–45)
HGB BLD-MCNC: 10.3 G/DL — LOW (ref 11.5–15.5)
HGB BLD-MCNC: 9.7 G/DL — LOW (ref 11.5–15.5)
HYPOCHROMIA BLD QL: SLIGHT — SIGNIFICANT CHANGE UP
IMM GRANULOCYTES # BLD AUTO: 0.01 # — SIGNIFICANT CHANGE UP
IMM GRANULOCYTES NFR BLD AUTO: 0.3 % — SIGNIFICANT CHANGE UP (ref 0–1.5)
INR BLD: 1.36 — HIGH (ref 0.88–1.17)
LYMPHOCYTES # BLD AUTO: 1.14 K/UL — SIGNIFICANT CHANGE UP (ref 1–3.3)
LYMPHOCYTES # BLD AUTO: 29.1 % — SIGNIFICANT CHANGE UP (ref 13–44)
MANUAL SMEAR VERIFICATION: SIGNIFICANT CHANGE UP
MCHC RBC-ENTMCNC: 23.7 PG — LOW (ref 27–34)
MCHC RBC-ENTMCNC: 24.1 PG — LOW (ref 27–34)
MCHC RBC-ENTMCNC: 30.7 % — LOW (ref 32–36)
MCHC RBC-ENTMCNC: 30.8 % — LOW (ref 32–36)
MCV RBC AUTO: 77.1 FL — LOW (ref 80–100)
MCV RBC AUTO: 78.2 FL — LOW (ref 80–100)
MICROCYTES BLD QL: SLIGHT — SIGNIFICANT CHANGE UP
MONOCYTES # BLD AUTO: 0.44 K/UL — SIGNIFICANT CHANGE UP (ref 0–0.9)
MONOCYTES NFR BLD AUTO: 11.2 % — SIGNIFICANT CHANGE UP (ref 2–14)
NEUTROPHILS # BLD AUTO: 2.19 K/UL — SIGNIFICANT CHANGE UP (ref 1.8–7.4)
NEUTROPHILS NFR BLD AUTO: 55.8 % — SIGNIFICANT CHANGE UP (ref 43–77)
NRBC # FLD: 0 — SIGNIFICANT CHANGE UP
NRBC # FLD: 0 — SIGNIFICANT CHANGE UP
PLATELET # BLD AUTO: 151 K/UL — SIGNIFICANT CHANGE UP (ref 150–400)
PLATELET # BLD AUTO: 160 K/UL — SIGNIFICANT CHANGE UP (ref 150–400)
PLATELET COUNT - ESTIMATE: NORMAL — SIGNIFICANT CHANGE UP
PMV BLD: SIGNIFICANT CHANGE UP FL (ref 7–13)
PMV BLD: SIGNIFICANT CHANGE UP FL (ref 7–13)
POIKILOCYTOSIS BLD QL AUTO: SLIGHT — SIGNIFICANT CHANGE UP
POTASSIUM SERPL-MCNC: 4 MMOL/L — SIGNIFICANT CHANGE UP (ref 3.5–5.3)
POTASSIUM SERPL-SCNC: 4 MMOL/L — SIGNIFICANT CHANGE UP (ref 3.5–5.3)
PROT SERPL-MCNC: 7.8 G/DL — SIGNIFICANT CHANGE UP (ref 6–8.3)
PROTHROM AB SERPL-ACNC: 15.7 SEC — HIGH (ref 9.8–13.1)
RBC # BLD: 4.1 M/UL — SIGNIFICANT CHANGE UP (ref 3.8–5.2)
RBC # BLD: 4.27 M/UL — SIGNIFICANT CHANGE UP (ref 3.8–5.2)
RBC # FLD: SIGNIFICANT CHANGE UP % (ref 10.3–14.5)
RBC # FLD: SIGNIFICANT CHANGE UP % (ref 10.3–14.5)
REVIEW TO FOLLOW: YES — SIGNIFICANT CHANGE UP
RH IG SCN BLD-IMP: POSITIVE — SIGNIFICANT CHANGE UP
SODIUM SERPL-SCNC: 137 MMOL/L — SIGNIFICANT CHANGE UP (ref 135–145)
TARGETS BLD QL SMEAR: SLIGHT — SIGNIFICANT CHANGE UP
WBC # BLD: 3.92 K/UL — SIGNIFICANT CHANGE UP (ref 3.8–10.5)
WBC # BLD: 4.28 K/UL — SIGNIFICANT CHANGE UP (ref 3.8–10.5)
WBC # FLD AUTO: 3.92 K/UL — SIGNIFICANT CHANGE UP (ref 3.8–10.5)
WBC # FLD AUTO: 4.28 K/UL — SIGNIFICANT CHANGE UP (ref 3.8–10.5)

## 2018-10-23 PROCEDURE — 99283 EMERGENCY DEPT VISIT LOW MDM: CPT

## 2018-10-23 RX ORDER — ACETAMINOPHEN 500 MG
975 TABLET ORAL ONCE
Qty: 0 | Refills: 0 | Status: COMPLETED | OUTPATIENT
Start: 2018-10-23 | End: 2018-10-23

## 2018-10-23 RX ADMIN — Medication 975 MILLIGRAM(S): at 15:14

## 2018-10-23 NOTE — ED PROVIDER NOTE - MEDICAL DECISION MAKING DETAILS
37 yo F, with PMH of small fibroids, recently diagnosed with LLE DVT 8/2018 currently on Xarelto presents to the ER c/o increased vaginal bleeding during menstrual period. Well appearing female p/w excessive vaginal bleeding during menstrual period after starting Xarelto, hx of iron transfusion w/ Hg of 7 in the past. Plan: check labs, coags, T&S, Hcg, give IVF, and reassess.

## 2018-10-23 NOTE — ED ADULT NURSE REASSESSMENT NOTE - NS ED NURSE REASSESS COMMENT FT1
Patient awake and alert, c/o having heavy vaginal bleeding with clots, changing her pad every 45-50 minutes today. States feeling tired and having a headache. IV placed, labs sent, vital signs stable as noted.

## 2018-10-23 NOTE — ED PROVIDER NOTE - CARE PLAN
Principal Discharge DX:	Vaginal bleeding  Assessment and plan of treatment:	Follow up with your PMD within 48-72 hrs. Follow up with your OBGYN within 1-2 days or call our clinic  [phone]. Rest, increase your fluids. No heavy lifting. Refrain from sexual intercourse.  Worsening pain, vaginal bleeding more than 1 pad in one hour, fever, vomiting, dizziness, weakness or ANY NEW CONCERNING SYMPTOMS return to the emergency department.

## 2018-10-23 NOTE — ED PROVIDER NOTE - PROGRESS NOTE DETAILS
first cbc result noted, patient stable, nontoxic appearing, no symptoms, will check 4 hour cbc to eval for change. PA BILL: Patient reassessed, sitting comfortably in bed in NAD, denies any complaints. States feeling better, symptoms improved. Repeat CBC, H/H 9.7. Discussed with attending, patient can be discharged home to f/u with PCP and GYN. The patient was given verbal and written discharge instructions. Specifically, instructions when to return to the ED and when to seek follow-up from their pcp was discussed. Any specialty follow-up was discussed, including how to make an appointment.  Instructions were discussed in simple, plain language and was understood by the patient. The patient understands that should their symptoms worsen or any new symptoms arise, they should return to the ED immediately for further evaluation. All pt's questions were answered. Patient verbalizes understanding.

## 2018-10-23 NOTE — ED PROVIDER NOTE - ATTENDING CONTRIBUTION TO CARE
38f h/o dvt on xarelto presenting with increased vaginal bleeding. Patient notes since she has been on the xarelto, she bleeds more often, and over the last few days, has been increasingly heavy. States that today she bled 1 pad every hour, but has since slowed down. Has occasional cramping, none at this time, occasional lightheadedness. Denies chest pain or shortness of breath, no dysuria or frequency, no fevers of chills, no leg swelling, had cta done which was negative prior.   exam  GEN - NAD; well appearing; A+O x3   HEAD - NC/AT   EYES- PERRL, EOMI  ENT: Airway patent, mmm, Oral cavity and pharynx normal. No inflammation, swelling, exudate, or lesions.  NECK: Neck supple, non-tender without lymphadenopathy, no masses.  PULMONARY - CTA b/l, symmetric breath sounds.   CARDIAC -s1s2, RRR, no M,G,R  ABDOMEN - +BS, ND, NT, soft, no guarding, no rebound, no masses   BACK - no CVA tenderness, Normal  spine   EXTREMITIES - FROM, symmetric pulses, capillary refill < 2 seconds, no edema   SKIN - no rash or bruising   NEUROLOGIC - alert, speech clear, no focal deficits  PSYCH -nl mood/affect, nl insight.  a/p-patient with h/o dvt on xarelto presenting with heavy vaginal bleeding, vss, nontoxic appearing, will check labs, preg test, monitor, reass.

## 2018-10-23 NOTE — ED ADULT NURSE NOTE - NSIMPLEMENTINTERV_GEN_ALL_ED
Implemented All Fall with Harm Risk Interventions:  [place] to call system. Call bell, personal items and telephone within reach. Instruct patient to call for assistance. Room bathroom lighting operational. Non-slip footwear when patient is off stretcher. Physically safe environment: no spills, clutter or unnecessary equipment. Stretcher in lowest position, wheels locked, appropriate side rails in place. Provide visual cue, wrist band, yellow gown, etc. Monitor gait and stability. Monitor for mental status changes and reorient to person, place, and time. Review medications for side effects contributing to fall risk. Reinforce activity limits and safety measures with patient and family. Provide visual clues: red socks.

## 2018-10-23 NOTE — ED PROVIDER NOTE - OBJECTIVE STATEMENT
37 yo F, with PMH of small fibroids, recently diagnosed with LLE DVT 8/2018 currently on Xarelto presents to the ER c/o increased vaginal bleeding during menstrual period. Pt states going through 1 tampon & 1 pad every 55min. Admits having excessive vaginal bleeding during menstrual period since Xarelto. States her normal menstrual period last for 5 days, but now extends to 17 days due to Xarelto. Reports pelvic cramping pain, 7/10. Admits received iron transfusion x2, last one 1.5weeks ago. Admits having regular bowel movements. Denies any fever, chills, n/v/d, dysuria, chest pain, sob, leg swelling, or any other complaints.

## 2018-10-23 NOTE — ED ADULT TRIAGE NOTE - CHIEF COMPLAINT QUOTE
Pt c/o excessive vaginal bleeding, was put on Xarelto in August and has been having heavy periods since then, had an iron infusion last month. Pt states she was told to expect heavier bleeding on Xarelto, but states that the bleeding is even heavier now. C/o mild abd cramping.

## 2018-10-23 NOTE — ED PROVIDER NOTE - PLAN OF CARE
Follow up with your PMD within 48-72 hrs. Follow up with your OBGYN within 1-2 days or call our clinic  [phone]. Rest, increase your fluids. No heavy lifting. Refrain from sexual intercourse.  Worsening pain, vaginal bleeding more than 1 pad in one hour, fever, vomiting, dizziness, weakness or ANY NEW CONCERNING SYMPTOMS return to the emergency department.

## 2018-10-30 ENCOUNTER — RX RENEWAL (OUTPATIENT)
Age: 38
End: 2018-10-30

## 2018-11-01 NOTE — ED PROVIDER NOTE - NS ED NOTE AC HIGH RISK COUNTRIES
No Stroke Warning Signs and Symptoms/Risk Factors for Stroke/Stroke Education Booklet/Call 911 for Stroke/Prescribed Medications/Need for Followup After Discharge

## 2019-01-25 ENCOUNTER — APPOINTMENT (OUTPATIENT)
Dept: OBGYN | Facility: CLINIC | Age: 39
End: 2019-01-25
Payer: COMMERCIAL

## 2019-01-25 VITALS
SYSTOLIC BLOOD PRESSURE: 155 MMHG | WEIGHT: 201.31 LBS | BODY MASS INDEX: 33.54 KG/M2 | HEIGHT: 65 IN | HEART RATE: 67 BPM | DIASTOLIC BLOOD PRESSURE: 88 MMHG

## 2019-01-25 DIAGNOSIS — B96.89 ACUTE VAGINITIS: ICD-10-CM

## 2019-01-25 DIAGNOSIS — N76.0 ACUTE VAGINITIS: ICD-10-CM

## 2019-01-25 PROCEDURE — 99214 OFFICE O/P EST MOD 30 MIN: CPT | Mod: 25

## 2019-01-25 PROCEDURE — 96372 THER/PROPH/DIAG INJ SC/IM: CPT

## 2019-01-25 RX ORDER — CEFTRIAXONE 250 MG/1
250 INJECTION, POWDER, FOR SOLUTION INTRAMUSCULAR; INTRAVENOUS
Qty: 1 | Refills: 0 | Status: COMPLETED | OUTPATIENT
Start: 2019-01-25

## 2019-01-25 RX ORDER — NORETHINDRONE ACETATE 5 MG/1
5 TABLET ORAL DAILY
Qty: 30 | Refills: 0 | Status: DISCONTINUED | COMMUNITY
Start: 2018-08-09 | End: 2019-01-25

## 2019-01-25 RX ADMIN — CEFTRIAXONE 1 MG: 250 INJECTION, POWDER, FOR SOLUTION INTRAMUSCULAR; INTRAVENOUS at 00:00

## 2019-01-29 DIAGNOSIS — N73.0 ACUTE PARAMETRITIS AND PELVIC CELLULITIS: ICD-10-CM

## 2019-01-29 LAB
C TRACH RRNA SPEC QL NAA+PROBE: NOT DETECTED
CANDIDA VAG CYTO: NOT DETECTED
G VAGINALIS+PREV SP MTYP VAG QL MICRO: NOT DETECTED
HBV SURFACE AG SER QL: NONREACTIVE
HCV AB SER QL: NONREACTIVE
HCV S/CO RATIO: 0.11 S/CO
HIV1+2 AB SPEC QL IA.RAPID: NONREACTIVE
N GONORRHOEA RRNA SPEC QL NAA+PROBE: NOT DETECTED
SOURCE AMPLIFICATION: NORMAL
T PALLIDUM AB SER QL IA: NEGATIVE
T VAGINALIS VAG QL WET PREP: NOT DETECTED

## 2019-01-29 NOTE — PHYSICAL EXAM
[Awake] : awake [Alert] : alert [Acute Distress] : no acute distress [Soft] : soft [Tender] : tender [Distended] : not distended [Obese] : obese [Suprapubic] : in the suprapubic area [Rebound] : no rebound [Guarding] : no guarding [Oriented x3] : oriented to person, place, and time [Normal] : uterus [Discharge] : a  ~M vaginal discharge was present [Moderate] : moderate [White] : white [No Bleeding] : there was no active vaginal bleeding [Motion Tenderness] : there was cervical motion tenderness [Tenderness] : tender [Uterine Adnexae] : were not tender and not enlarged

## 2019-01-29 NOTE — HISTORY OF PRESENT ILLNESS
[Sexually Active] : is sexually active [Monogamous] : is monogamous [Contraception] : uses contraception [Tubal Ligation] : has had a tubal ligation

## 2019-01-29 NOTE — COUNSELING
[STD (testing, results, tx)] : STD (testing, results, tx) [HIV Pretest] : HIV pretest [Safe Sexual Practices] : safe sexual practices [Pain Management] : pain management [Medication Management] : medication management

## 2019-01-31 ENCOUNTER — ASOB RESULT (OUTPATIENT)
Age: 39
End: 2019-01-31

## 2019-01-31 ENCOUNTER — APPOINTMENT (OUTPATIENT)
Dept: OBGYN | Facility: CLINIC | Age: 39
End: 2019-01-31
Payer: COMMERCIAL

## 2019-01-31 PROCEDURE — 76830 TRANSVAGINAL US NON-OB: CPT

## 2019-02-05 ENCOUNTER — TRANSCRIPTION ENCOUNTER (OUTPATIENT)
Age: 39
End: 2019-02-05

## 2019-02-14 ENCOUNTER — APPOINTMENT (OUTPATIENT)
Dept: OBGYN | Facility: CLINIC | Age: 39
End: 2019-02-14

## 2019-02-14 ENCOUNTER — ASOB RESULT (OUTPATIENT)
Age: 39
End: 2019-02-14

## 2019-02-14 ENCOUNTER — APPOINTMENT (OUTPATIENT)
Dept: OBGYN | Facility: CLINIC | Age: 39
End: 2019-02-14
Payer: COMMERCIAL

## 2019-02-14 VITALS
SYSTOLIC BLOOD PRESSURE: 149 MMHG | BODY MASS INDEX: 33.47 KG/M2 | HEIGHT: 65 IN | DIASTOLIC BLOOD PRESSURE: 93 MMHG | HEART RATE: 72 BPM | WEIGHT: 200.9 LBS

## 2019-02-14 DIAGNOSIS — R18.8 OTHER ASCITES: ICD-10-CM

## 2019-02-14 DIAGNOSIS — R10.2 PELVIC AND PERINEAL PAIN: ICD-10-CM

## 2019-02-14 LAB
BASOPHILS # BLD AUTO: 0.07 K/UL
BASOPHILS NFR BLD AUTO: 1.7 %
EOSINOPHIL # BLD AUTO: 0.1 K/UL
EOSINOPHIL NFR BLD AUTO: 2.4 %
HCT VFR BLD CALC: 37.9 %
HGB BLD-MCNC: 11.7 G/DL
IMM GRANULOCYTES NFR BLD AUTO: 0 %
LYMPHOCYTES # BLD AUTO: 1.38 K/UL
LYMPHOCYTES NFR BLD AUTO: 32.8 %
MAN DIFF?: NORMAL
MCHC RBC-ENTMCNC: 26.7 PG
MCHC RBC-ENTMCNC: 30.9 GM/DL
MCV RBC AUTO: 86.5 FL
MONOCYTES # BLD AUTO: 0.42 K/UL
MONOCYTES NFR BLD AUTO: 10 %
NEUTROPHILS # BLD AUTO: 2.24 K/UL
NEUTROPHILS NFR BLD AUTO: 53.1 %
PLATELET # BLD AUTO: 185 K/UL
RBC # BLD: 4.38 M/UL
RBC # FLD: 14.6 %
WBC # FLD AUTO: 4.21 K/UL

## 2019-02-14 PROCEDURE — 76857 US EXAM PELVIC LIMITED: CPT | Mod: 59

## 2019-02-14 PROCEDURE — 99214 OFFICE O/P EST MOD 30 MIN: CPT

## 2019-02-14 PROCEDURE — 76830 TRANSVAGINAL US NON-OB: CPT

## 2019-02-14 NOTE — PHYSICAL EXAM
[Normal] : uterus [No Bleeding] : there was no active vaginal bleeding [Uterine Adnexae] : were not tender and not enlarged [FreeTextEntry7] : tender with movement

## 2019-02-21 ENCOUNTER — APPOINTMENT (OUTPATIENT)
Dept: OBGYN | Facility: CLINIC | Age: 39
End: 2019-02-21
Payer: COMMERCIAL

## 2019-02-21 VITALS
HEIGHT: 65 IN | DIASTOLIC BLOOD PRESSURE: 100 MMHG | SYSTOLIC BLOOD PRESSURE: 163 MMHG | BODY MASS INDEX: 33.66 KG/M2 | HEART RATE: 66 BPM | WEIGHT: 202 LBS

## 2019-02-21 DIAGNOSIS — N83.209 UNSPECIFIED OVARIAN CYST, UNSPECIFIED SIDE: ICD-10-CM

## 2019-02-21 DIAGNOSIS — N73.6 FEMALE PELVIC PERITONEAL ADHESIONS (POSTINFECTIVE): ICD-10-CM

## 2019-02-21 LAB
BACTERIA UR CULT: NORMAL
HCG SERPL-MCNC: <1 MIU/ML

## 2019-02-21 PROCEDURE — 99213 OFFICE O/P EST LOW 20 MIN: CPT

## 2019-04-13 ENCOUNTER — EMERGENCY (EMERGENCY)
Facility: HOSPITAL | Age: 39
LOS: 1 days | Discharge: HOME | End: 2019-04-13
Attending: EMERGENCY MEDICINE | Admitting: EMERGENCY MEDICINE
Payer: COMMERCIAL

## 2019-04-13 VITALS
HEART RATE: 72 BPM | RESPIRATION RATE: 16 BRPM | SYSTOLIC BLOOD PRESSURE: 129 MMHG | OXYGEN SATURATION: 100 % | TEMPERATURE: 98 F | DIASTOLIC BLOOD PRESSURE: 73 MMHG

## 2019-04-13 DIAGNOSIS — Z98.890 OTHER SPECIFIED POSTPROCEDURAL STATES: Chronic | ICD-10-CM

## 2019-04-13 DIAGNOSIS — K08.409 PARTIAL LOSS OF TEETH, UNSPECIFIED CAUSE, UNSPECIFIED CLASS: Chronic | ICD-10-CM

## 2019-04-13 DIAGNOSIS — Z98.51 TUBAL LIGATION STATUS: Chronic | ICD-10-CM

## 2019-04-13 PROCEDURE — 99284 EMERGENCY DEPT VISIT MOD MDM: CPT | Mod: 25

## 2019-04-13 NOTE — ED ADULT TRIAGE NOTE - CHIEF COMPLAINT QUOTE
Pt w/ hx of DVT not on anti-coagulants since december c/o LLE spasms and cramping radiating up the leg. worse when standing x 1 week progressively worsening. Pt reports this pain feels like her past DVT pain.

## 2019-04-14 VITALS
DIASTOLIC BLOOD PRESSURE: 68 MMHG | RESPIRATION RATE: 16 BRPM | OXYGEN SATURATION: 100 % | TEMPERATURE: 98 F | HEART RATE: 72 BPM | SYSTOLIC BLOOD PRESSURE: 117 MMHG

## 2019-04-14 PROCEDURE — 93971 EXTREMITY STUDY: CPT | Mod: 26,LT

## 2019-04-14 RX ORDER — ACETAMINOPHEN 500 MG
975 TABLET ORAL ONCE
Qty: 0 | Refills: 0 | Status: COMPLETED | OUTPATIENT
Start: 2019-04-14 | End: 2019-04-14

## 2019-04-14 RX ORDER — LIDOCAINE 4 G/100G
1 CREAM TOPICAL ONCE
Qty: 0 | Refills: 0 | Status: COMPLETED | OUTPATIENT
Start: 2019-04-14 | End: 2019-04-14

## 2019-04-14 RX ORDER — IBUPROFEN 200 MG
600 TABLET ORAL ONCE
Qty: 0 | Refills: 0 | Status: COMPLETED | OUTPATIENT
Start: 2019-04-14 | End: 2019-04-14

## 2019-04-14 RX ADMIN — Medication 975 MILLIGRAM(S): at 01:06

## 2019-04-14 RX ADMIN — LIDOCAINE 1 PATCH: 4 CREAM TOPICAL at 03:38

## 2019-04-14 RX ADMIN — Medication 600 MILLIGRAM(S): at 03:38

## 2019-04-14 NOTE — ED PROVIDER NOTE - OBJECTIVE STATEMENT
38F hx DVT presenting with left calf pain x 1 week. Patient endorses left leg DVT last August provoked possibly by leg trauma, OCP use, and long plane ride. Was on AC for several months until December. States symptoms feels very similar to when she had DVT in past. No fever, chills, cp, sob, n/v/d, dizziness, headache, dysura

## 2019-04-14 NOTE — ED ADULT NURSE NOTE - OBJECTIVE STATEMENT
39 y/o F hx p/w left calf pain for 1 week.  had left leg DVT last August D/C AC in december.  awaiting US

## 2019-04-14 NOTE — ED PROVIDER NOTE - NSFOLLOWUPINSTRUCTIONS_ED_ALL_ED_FT
Take tylenol 500mg every 6 hours for pain as needed  Return to ED for any new or worsening symptoms  Follow up with your primary care doctor

## 2019-05-13 ENCOUNTER — RX RENEWAL (OUTPATIENT)
Age: 39
End: 2019-05-13

## 2019-07-03 ENCOUNTER — APPOINTMENT (OUTPATIENT)
Dept: OBGYN | Facility: CLINIC | Age: 39
End: 2019-07-03

## 2019-07-22 ENCOUNTER — TRANSCRIPTION ENCOUNTER (OUTPATIENT)
Age: 39
End: 2019-07-22

## 2019-07-23 ENCOUNTER — APPOINTMENT (OUTPATIENT)
Dept: OBGYN | Facility: CLINIC | Age: 39
End: 2019-07-23
Payer: COMMERCIAL

## 2019-07-23 VITALS
HEART RATE: 80 BPM | WEIGHT: 202 LBS | BODY MASS INDEX: 33.66 KG/M2 | DIASTOLIC BLOOD PRESSURE: 56 MMHG | SYSTOLIC BLOOD PRESSURE: 132 MMHG | HEIGHT: 65 IN

## 2019-07-23 DIAGNOSIS — N80.0 ENDOMETRIOSIS OF UTERUS: ICD-10-CM

## 2019-07-23 DIAGNOSIS — N94.6 DYSMENORRHEA, UNSPECIFIED: ICD-10-CM

## 2019-07-23 DIAGNOSIS — N92.0 EXCESSIVE AND FREQUENT MENSTRUATION WITH REGULAR CYCLE: ICD-10-CM

## 2019-07-23 PROCEDURE — 99213 OFFICE O/P EST LOW 20 MIN: CPT

## 2019-07-31 PROBLEM — N94.6 DYSMENORRHEA: Status: ACTIVE | Noted: 2019-07-31

## 2019-07-31 PROBLEM — N80.0 UTERUS, ADENOMYOSIS: Status: ACTIVE | Noted: 2019-07-31

## 2019-07-31 PROBLEM — N92.0 MENORRHAGIA WITH REGULAR CYCLE: Status: ACTIVE | Noted: 2019-07-31

## 2019-07-31 RX ORDER — DOXYCYCLINE HYCLATE 100 MG/1
100 CAPSULE ORAL
Qty: 28 | Refills: 0 | Status: DISCONTINUED | COMMUNITY
Start: 2019-01-25 | End: 2019-07-31

## 2019-07-31 RX ORDER — METRONIDAZOLE 500 MG/1
500 TABLET ORAL TWICE DAILY
Qty: 14 | Refills: 0 | Status: DISCONTINUED | COMMUNITY
Start: 2019-01-25 | End: 2019-07-31

## 2019-08-30 ENCOUNTER — CLINICAL ADVICE (OUTPATIENT)
Age: 39
End: 2019-08-30

## 2019-09-11 ENCOUNTER — OUTPATIENT (OUTPATIENT)
Dept: OUTPATIENT SERVICES | Facility: HOSPITAL | Age: 39
LOS: 1 days | End: 2019-09-11

## 2019-09-11 VITALS
RESPIRATION RATE: 18 BRPM | HEART RATE: 78 BPM | TEMPERATURE: 99 F | SYSTOLIC BLOOD PRESSURE: 130 MMHG | HEIGHT: 65 IN | DIASTOLIC BLOOD PRESSURE: 78 MMHG | OXYGEN SATURATION: 99 % | WEIGHT: 203.93 LBS

## 2019-09-11 DIAGNOSIS — N92.0 EXCESSIVE AND FREQUENT MENSTRUATION WITH REGULAR CYCLE: ICD-10-CM

## 2019-09-11 DIAGNOSIS — K08.409 PARTIAL LOSS OF TEETH, UNSPECIFIED CAUSE, UNSPECIFIED CLASS: Chronic | ICD-10-CM

## 2019-09-11 DIAGNOSIS — Z98.51 TUBAL LIGATION STATUS: Chronic | ICD-10-CM

## 2019-09-11 DIAGNOSIS — Z98.890 OTHER SPECIFIED POSTPROCEDURAL STATES: Chronic | ICD-10-CM

## 2019-09-11 LAB
HCT VFR BLD CALC: 31.9 % — LOW (ref 34.5–45)
HGB BLD-MCNC: 9.8 G/DL — LOW (ref 11.5–15.5)
MCHC RBC-ENTMCNC: 22.9 PG — LOW (ref 27–34)
MCHC RBC-ENTMCNC: 30.7 % — LOW (ref 32–36)
MCV RBC AUTO: 74.5 FL — LOW (ref 80–100)
NRBC # FLD: 0 K/UL — SIGNIFICANT CHANGE UP (ref 0–0)
PLATELET # BLD AUTO: 246 K/UL — SIGNIFICANT CHANGE UP (ref 150–400)
PMV BLD: 12.6 FL — SIGNIFICANT CHANGE UP (ref 7–13)
RBC # BLD: 4.28 M/UL — SIGNIFICANT CHANGE UP (ref 3.8–5.2)
RBC # FLD: 16.9 % — HIGH (ref 10.3–14.5)
WBC # BLD: 5.58 K/UL — SIGNIFICANT CHANGE UP (ref 3.8–10.5)
WBC # FLD AUTO: 5.58 K/UL — SIGNIFICANT CHANGE UP (ref 3.8–10.5)

## 2019-09-11 RX ORDER — SODIUM CHLORIDE 9 MG/ML
1000 INJECTION, SOLUTION INTRAVENOUS
Refills: 0 | Status: DISCONTINUED | OUTPATIENT
Start: 2019-09-13 | End: 2019-10-04

## 2019-09-11 NOTE — H&P PST ADULT - ATTENDING COMMENTS
Pt presents for D&C hysteroscopy with HLA ablation due to AUB.  Discussed R/A/B of procedure including but not limited to infection, bleeding and injury to uterus/bladder/bowel.  Pt expressed understanding and understands that amenorrhea may or may not occur

## 2019-09-11 NOTE — H&P PST ADULT - NSICDXPASTMEDICALHX_GEN_ALL_CORE_FT
PAST MEDICAL HISTORY:  DVT, lower extremity August 2018 left - txted with Xarelto x 4 months    Murmur, heart Functional    Obesity     Vaginal bleeding PAST MEDICAL HISTORY:  DVT, lower extremity August 2018 left - treated with Xarelto x 4 months    Murmur, heart Functional    Obesity     Vaginal bleeding

## 2019-09-11 NOTE — H&P PST ADULT - NSICDXPASTSURGICALHX_GEN_ALL_CORE_FT
PAST SURGICAL HISTORY:  H/O reduction mammoplasty 2011    H/O tubal ligation     History of  X 2 2000    repair of right pinky-tendon 2006  ? flexor tendon    S/P arthroscopy of shoulder left 2016 and 2016    S/P tooth extraction

## 2019-09-11 NOTE — H&P PST ADULT - NSICDXPROBLEM_GEN_ALL_CORE_FT
PROBLEM DIAGNOSES  Problem: Excessive and frequent menstruation with regular cycle  Assessment and Plan: Scheduled for Dilation and Curettage Hysteroscopy with HTA Ablation on 09/13/19.  Preop instructions provided and patient verbalizes understanding.  Labs done and results pending.  Famotidine provided with instructions. Hibiclens provided with instructions and was signed by patient. Teach-back method was utilized to assess patient's understanding. Patient verbalized understanding.

## 2019-09-11 NOTE — H&P PST ADULT - HISTORY OF PRESENT ILLNESS
39 yr old female with no significant medical hx presents for preop evaluation with c/o heavy long menstruation.  Pt is now scheduled for Dilation and Curettage Hysteroscopy with HTA Ablation on 09/13/19.

## 2019-09-11 NOTE — H&P PST ADULT - RS GEN PE MLT RESP DETAILS PC
no rales/clear to auscultation bilaterally/respirations non-labored/no wheezes/no rhonchi/breath sounds equal

## 2019-09-11 NOTE — H&P PST ADULT - GASTROINTESTINAL DETAILS
no masses palpable/bowel sounds normal/soft/no guarding/no organomegaly/nontender/no distention/no bruit/no rebound tenderness/no rigidity

## 2019-09-11 NOTE — H&P PST ADULT - NSICDXFAMILYHX_GEN_ALL_CORE_FT
FAMILY HISTORY:  Family history of breast cancer in mother, 2013    Father  Still living? Yes, Estimated age: Age Unknown  Family history of diabetes mellitus (DM), Age at diagnosis: Age Unknown    Mother  Still living? Yes, Estimated age: Age Unknown  Family history of hypertension in mother, Age at diagnosis: Age Unknown  Family history of hypothyroidism, Age at diagnosis: Age Unknown

## 2019-09-12 ENCOUNTER — TRANSCRIPTION ENCOUNTER (OUTPATIENT)
Age: 39
End: 2019-09-12

## 2019-09-12 NOTE — ASU PATIENT PROFILE, ADULT - PSH
H/O reduction mammoplasty  2011  H/O tubal ligation    History of  X 2  2000  repair of right pinky-tendon  2006  ? flexor tendon  S/P arthroscopy of shoulder  left 2016 and 2016  S/P tooth extraction

## 2019-09-12 NOTE — ASU PATIENT PROFILE, ADULT - PMH
DVT, lower extremity  August 2018 left - treated with Xarelto x 4 months  Murmur, heart  Functional  Obesity    Vaginal bleeding

## 2019-09-13 ENCOUNTER — OUTPATIENT (OUTPATIENT)
Dept: OUTPATIENT SERVICES | Facility: HOSPITAL | Age: 39
LOS: 1 days | Discharge: HOME | End: 2019-09-13
Payer: COMMERCIAL

## 2019-09-13 ENCOUNTER — APPOINTMENT (OUTPATIENT)
Dept: OBGYN | Facility: HOSPITAL | Age: 39
End: 2019-09-13

## 2019-09-13 ENCOUNTER — RESULT REVIEW (OUTPATIENT)
Age: 39
End: 2019-09-13

## 2019-09-13 VITALS
HEART RATE: 68 BPM | DIASTOLIC BLOOD PRESSURE: 75 MMHG | OXYGEN SATURATION: 100 % | SYSTOLIC BLOOD PRESSURE: 121 MMHG | WEIGHT: 203.93 LBS | TEMPERATURE: 99 F | HEIGHT: 65 IN | RESPIRATION RATE: 18 BRPM

## 2019-09-13 VITALS
OXYGEN SATURATION: 98 % | SYSTOLIC BLOOD PRESSURE: 128 MMHG | RESPIRATION RATE: 20 BRPM | DIASTOLIC BLOOD PRESSURE: 78 MMHG | HEART RATE: 74 BPM

## 2019-09-13 DIAGNOSIS — Z98.890 OTHER SPECIFIED POSTPROCEDURAL STATES: Chronic | ICD-10-CM

## 2019-09-13 DIAGNOSIS — N92.0 EXCESSIVE AND FREQUENT MENSTRUATION WITH REGULAR CYCLE: ICD-10-CM

## 2019-09-13 DIAGNOSIS — K08.409 PARTIAL LOSS OF TEETH, UNSPECIFIED CAUSE, UNSPECIFIED CLASS: Chronic | ICD-10-CM

## 2019-09-13 DIAGNOSIS — Z98.51 TUBAL LIGATION STATUS: Chronic | ICD-10-CM

## 2019-09-13 LAB — HCG UR QL: NEGATIVE — SIGNIFICANT CHANGE UP

## 2019-09-13 PROCEDURE — 58563 HYSTEROSCOPY ABLATION: CPT | Mod: GC

## 2019-09-13 PROCEDURE — 88305 TISSUE EXAM BY PATHOLOGIST: CPT | Mod: 26

## 2019-09-13 RX ORDER — OXYCODONE HYDROCHLORIDE 5 MG/1
5 TABLET ORAL ONCE
Refills: 0 | Status: DISCONTINUED | OUTPATIENT
Start: 2019-09-13 | End: 2019-09-13

## 2019-09-13 RX ORDER — ONDANSETRON 8 MG/1
4 TABLET, FILM COATED ORAL ONCE
Refills: 0 | Status: DISCONTINUED | OUTPATIENT
Start: 2019-09-13 | End: 2019-10-04

## 2019-09-13 RX ORDER — SODIUM CHLORIDE 9 MG/ML
1000 INJECTION, SOLUTION INTRAVENOUS
Refills: 0 | Status: DISCONTINUED | OUTPATIENT
Start: 2019-09-13 | End: 2019-10-04

## 2019-09-13 RX ORDER — FENTANYL CITRATE 50 UG/ML
50 INJECTION INTRAVENOUS
Refills: 0 | Status: DISCONTINUED | OUTPATIENT
Start: 2019-09-13 | End: 2019-09-13

## 2019-09-13 RX ADMIN — FENTANYL CITRATE 50 MICROGRAM(S): 50 INJECTION INTRAVENOUS at 16:45

## 2019-09-13 RX ADMIN — OXYCODONE HYDROCHLORIDE 5 MILLIGRAM(S): 5 TABLET ORAL at 17:15

## 2019-09-13 RX ADMIN — OXYCODONE HYDROCHLORIDE 5 MILLIGRAM(S): 5 TABLET ORAL at 16:45

## 2019-09-13 RX ADMIN — FENTANYL CITRATE 50 MICROGRAM(S): 50 INJECTION INTRAVENOUS at 16:30

## 2019-09-13 RX ADMIN — SODIUM CHLORIDE 125 MILLILITER(S): 9 INJECTION, SOLUTION INTRAVENOUS at 16:42

## 2019-09-13 RX ADMIN — FENTANYL CITRATE 50 MICROGRAM(S): 50 INJECTION INTRAVENOUS at 17:00

## 2019-09-13 NOTE — BRIEF OPERATIVE NOTE - NSICDXBRIEFPROCEDURE_GEN_ALL_CORE_FT
PROCEDURES:  Ablation, uterus 13-Sep-2019 16:35:26  Roberta Rajan  Hysteroscopy, with dilation and curettage 13-Sep-2019 16:35:06  Roberta Rajan

## 2019-09-13 NOTE — ASU DISCHARGE PLAN (ADULT/PEDIATRIC) - CARE PROVIDER_API CALL
Hawa Coyle (MD)  Obstetrics and Gynecology  [facility] [place] [address]  Phone: [phone]  Fax: [phone]  Follow Up Time:

## 2019-09-13 NOTE — BRIEF OPERATIVE NOTE - OPERATION/FINDINGS
EUA. Uterus sounded to 11.   No signs of polyps, fibroids or pathology in cavity - appeared atrophic with few blood clots  D+C performed  HTA ablation used  Cavity visualized post procedure.

## 2019-09-13 NOTE — ASU DISCHARGE PLAN (ADULT/PEDIATRIC) - ASU DC SPECIAL INSTRUCTIONSFT
Regular diet as tolerated, regular activity as tolerated, no heavy lifting for first two weeks.  Take tylenol and motrin as needed for pain control. Nothing per vagina (ie no tampons, douching, or intercourse) until cleared by your doctor.  Shower only, no baths.  Please make an appointment to see your doctor in 2 weeks.  Call your doctor or go to the ED if you have bleeding that does not stop, are unable to urinate, or have a fever >100.4.

## 2019-09-13 NOTE — ASU DISCHARGE PLAN (ADULT/PEDIATRIC) - MEDICATION INSTRUCTIONS
You received IV Tylenol for pain management at 3:30 PM. Please DO NOT take any Tylenol (Acetaminophen) containing products, such as Vicodin, Percocet, Excedrin, and cold medications for the next 6 hours (until 9:30 PM). DO NOT TAKE MORE THAN 3000 MG OF TYLENOL in a 24 hour period.  ************************** You received IV Toradol for pain management at 4:30 PM. Please DO NOT take Motrin/Ibuprofen/Advil/Aleve/NSAIDs (Non-Steroidal Anti-Inflammatory Drugs) for the next 6 hours (until 10:30 PM).

## 2019-09-13 NOTE — ASU DISCHARGE PLAN (ADULT/PEDIATRIC) - FOLLOW UP APPOINTMENTS
[facility]: may also call Recovery Room (PACU) 24/7 @ [phone]/[id number]:

## 2019-09-13 NOTE — ASU DISCHARGE PLAN (ADULT/PEDIATRIC) - CALL YOUR DOCTOR IF YOU HAVE ANY OF THE FOLLOWING:
Fever greater than (need to indicate Fahrenheit or Celsius)/Bleeding that does not stop/Pain not relieved by Medications Fever greater than (need to indicate Fahrenheit or Celsius)/Bleeding that does not stop/Pain not relieved by Medications/Wound/Surgical Site with redness, or foul smelling discharge or pus/Nausea and vomiting that does not stop/Unable to urinate/Inability to tolerate liquids or foods

## 2019-09-13 NOTE — ASU DISCHARGE PLAN (ADULT/PEDIATRIC) - ACTIVITY LEVEL
until cleared by your doctor/No excercise/No weight bearing/Nothing per vagina No excercise/No weight bearing/No intercourse/No tampons/Nothing per vagina/No tub baths/No douching/until cleared by your doctor

## 2019-09-17 LAB — SURGICAL PATHOLOGY STUDY: SIGNIFICANT CHANGE UP

## 2019-09-18 PROBLEM — I82.409 ACUTE EMBOLISM AND THROMBOSIS OF UNSPECIFIED DEEP VEINS OF UNSPECIFIED LOWER EXTREMITY: Chronic | Status: ACTIVE | Noted: 2019-09-11

## 2019-09-26 ENCOUNTER — APPOINTMENT (OUTPATIENT)
Dept: OBGYN | Facility: CLINIC | Age: 39
End: 2019-09-26
Payer: COMMERCIAL

## 2019-09-26 VITALS
HEART RATE: 77 BPM | SYSTOLIC BLOOD PRESSURE: 136 MMHG | WEIGHT: 204 LBS | DIASTOLIC BLOOD PRESSURE: 93 MMHG | BODY MASS INDEX: 33.99 KG/M2 | TEMPERATURE: 98.9 F | HEIGHT: 65 IN

## 2019-09-26 DIAGNOSIS — Z48.89 ENCOUNTER FOR OTHER SPECIFIED SURGICAL AFTERCARE: ICD-10-CM

## 2019-09-26 PROCEDURE — 99213 OFFICE O/P EST LOW 20 MIN: CPT

## 2019-09-26 NOTE — PHYSICAL EXAM
[Awake] : awake [Alert] : alert [Acute Distress] : no acute distress [Soft] : soft [Tender] : non tender [Distended] : not distended [Oriented x3] : oriented to person, place, and time [No Bleeding] : there was no active vaginal bleeding [Normal] : uterus [Uterine Adnexae] : were not tender and not enlarged

## 2019-09-29 LAB — BACTERIA UR CULT: NORMAL

## 2020-01-01 NOTE — ED ADULT NURSE NOTE - NSFALLRSKHARMRISK_ED_ALL_ED
"Subjective:      Patient ID: Candy Castelan is a 4 wk.o. female here with mother. Patient brought in for Well Child        History of Present Illness:    HPI   School/Childcare:  Home c mom  Diet:  appropriate for age, nursing, a little formula supplementation  Growth:  growth chart reviewed, normal  Elimination:  no issues c stooling or voiding  Dental care (if applicable):    Sleep:  no issues, safe environment for age  Development/Behavior:  No pp depression concerns from mom, denies SI/HI, encouraged her to reach out to her dr for any concerns.  Physical activity:  limiting screen time, active play appropriate for age  Safety:  appropriate use of carseat/booster/belt        Review of Systems   Constitutional: Negative for activity change, appetite change and fever.   HENT: Negative for congestion and mouth sores.    Eyes: Negative for discharge and redness.   Respiratory: Positive for cough and wheezing.    Cardiovascular: Negative for leg swelling and cyanosis.   Gastrointestinal: Negative for constipation, diarrhea and vomiting.   Genitourinary: Negative for decreased urine volume and hematuria.   Musculoskeletal: Negative for extremity weakness.   Skin: Negative for rash and wound.        Past Medical History:   Diagnosis Date    LGA (large for gestational age) infant 2020     History reviewed. No pertinent surgical history.  Review of patient's allergies indicates:  No Known Allergies      Objective:     Vitals:    03/13/20 1452   Weight: 5.36 kg (11 lb 13.1 oz)   Height: 1' 11" (0.584 m)   HC: 38 cm (14.96")     Physical Exam   Constitutional: She appears well-developed and well-nourished. She is active. No distress.   Well appearing   HENT:   Head: Anterior fontanelle is flat. No cranial deformity.   Right Ear: Tympanic membrane normal.   Left Ear: Tympanic membrane normal.   Nose: Nose normal.   Mouth/Throat: Mucous membranes are moist. Oropharynx is clear.   Normal palate   Eyes: Red " reflex is present bilaterally. Pupils are equal, round, and reactive to light. Conjunctivae are normal.   Neck: Neck supple.   Cardiovascular: Normal rate, regular rhythm, S1 normal and S2 normal.   No murmur heard.  Femoral pulses 2+   Pulmonary/Chest: Effort normal and breath sounds normal.   Abdominal: Soft. Bowel sounds are normal. She exhibits no distension and no mass. There is no hepatosplenomegaly. There is no tenderness.   Umbilicus normal for age   Genitourinary:   Genitourinary Comments: Normal external genitalia   Musculoskeletal: She exhibits no edema or deformity.   Clavicles intact  Spine normal  Negative Faustin and Ortolani bilaterally   Lymphadenopathy: No occipital adenopathy is present.     She has no cervical adenopathy.   Neurological: She is alert. She has normal strength. She exhibits normal muscle tone.   Skin: Skin is warm. Capillary refill takes less than 2 seconds. No rash noted. No cyanosis. No jaundice or pallor.   Vitals reviewed.        No results found for this or any previous visit (from the past 24 hour(s)).    NBS pending      Assessment:       Candy was seen today for well child.    Diagnoses and all orders for this visit:    Encounter for routine child health examination without abnormal findings        Plan:       Normal growth and development.  Age-appropriate anticipatory guidance provided.  Reviewed age-appropriate diet and activity level.  Schedule next [facility].      Follow up in about 1 month (around 2020).   yes

## 2020-08-14 ENCOUNTER — APPOINTMENT (OUTPATIENT)
Dept: OBGYN | Facility: CLINIC | Age: 40
End: 2020-08-14
Payer: COMMERCIAL

## 2020-08-14 VITALS
HEART RATE: 64 BPM | SYSTOLIC BLOOD PRESSURE: 135 MMHG | HEIGHT: 65 IN | DIASTOLIC BLOOD PRESSURE: 85 MMHG | BODY MASS INDEX: 32.99 KG/M2 | WEIGHT: 198 LBS | TEMPERATURE: 98.2 F

## 2020-08-14 DIAGNOSIS — N92.1 EXCESSIVE AND FREQUENT MENSTRUATION WITH IRREGULAR CYCLE: ICD-10-CM

## 2020-08-14 PROCEDURE — 99213 OFFICE O/P EST LOW 20 MIN: CPT

## 2020-08-24 ENCOUNTER — EMERGENCY (EMERGENCY)
Facility: HOSPITAL | Age: 40
LOS: 0 days | Discharge: HOME | End: 2020-08-24
Attending: STUDENT IN AN ORGANIZED HEALTH CARE EDUCATION/TRAINING PROGRAM
Payer: COMMERCIAL

## 2020-08-24 VITALS
TEMPERATURE: 99 F | WEIGHT: 195.99 LBS | DIASTOLIC BLOOD PRESSURE: 92 MMHG | OXYGEN SATURATION: 100 % | HEART RATE: 57 BPM | SYSTOLIC BLOOD PRESSURE: 164 MMHG | HEIGHT: 64 IN | RESPIRATION RATE: 16 BRPM

## 2020-08-24 VITALS
DIASTOLIC BLOOD PRESSURE: 87 MMHG | OXYGEN SATURATION: 98 % | HEART RATE: 59 BPM | TEMPERATURE: 99 F | RESPIRATION RATE: 16 BRPM | SYSTOLIC BLOOD PRESSURE: 156 MMHG

## 2020-08-24 DIAGNOSIS — R07.9 CHEST PAIN, UNSPECIFIED: ICD-10-CM

## 2020-08-24 DIAGNOSIS — Z98.51 TUBAL LIGATION STATUS: Chronic | ICD-10-CM

## 2020-08-24 DIAGNOSIS — Z88.0 ALLERGY STATUS TO PENICILLIN: ICD-10-CM

## 2020-08-24 DIAGNOSIS — Z98.890 OTHER SPECIFIED POSTPROCEDURAL STATES: Chronic | ICD-10-CM

## 2020-08-24 DIAGNOSIS — Z91.040 LATEX ALLERGY STATUS: ICD-10-CM

## 2020-08-24 DIAGNOSIS — K08.409 PARTIAL LOSS OF TEETH, UNSPECIFIED CAUSE, UNSPECIFIED CLASS: Chronic | ICD-10-CM

## 2020-08-24 DIAGNOSIS — Z90.13 ACQUIRED ABSENCE OF BILATERAL BREASTS AND NIPPLES: ICD-10-CM

## 2020-08-24 DIAGNOSIS — Z91.010 ALLERGY TO PEANUTS: ICD-10-CM

## 2020-08-24 DIAGNOSIS — Z86.718 PERSONAL HISTORY OF OTHER VENOUS THROMBOSIS AND EMBOLISM: ICD-10-CM

## 2020-08-24 LAB
ALBUMIN SERPL ELPH-MCNC: 3.1 G/DL — LOW (ref 3.3–5)
ALP SERPL-CCNC: 45 U/L — SIGNIFICANT CHANGE UP (ref 40–120)
ALT FLD-CCNC: 20 U/L — SIGNIFICANT CHANGE UP (ref 12–78)
ANION GAP SERPL CALC-SCNC: 4 MMOL/L — LOW (ref 5–17)
APTT BLD: 28.4 SEC — SIGNIFICANT CHANGE UP (ref 27.5–35.5)
AST SERPL-CCNC: 16 U/L — SIGNIFICANT CHANGE UP (ref 15–37)
BASOPHILS # BLD AUTO: 0.04 K/UL — SIGNIFICANT CHANGE UP (ref 0–0.2)
BASOPHILS NFR BLD AUTO: 0.8 % — SIGNIFICANT CHANGE UP (ref 0–2)
BILIRUB SERPL-MCNC: 0.5 MG/DL — SIGNIFICANT CHANGE UP (ref 0.2–1.2)
BUN SERPL-MCNC: 14 MG/DL — SIGNIFICANT CHANGE UP (ref 7–23)
CALCIUM SERPL-MCNC: 8.3 MG/DL — LOW (ref 8.5–10.1)
CHLORIDE SERPL-SCNC: 105 MMOL/L — SIGNIFICANT CHANGE UP (ref 96–108)
CO2 SERPL-SCNC: 28 MMOL/L — SIGNIFICANT CHANGE UP (ref 22–31)
CREAT SERPL-MCNC: 0.73 MG/DL — SIGNIFICANT CHANGE UP (ref 0.5–1.3)
EOSINOPHIL # BLD AUTO: 0.04 K/UL — SIGNIFICANT CHANGE UP (ref 0–0.5)
EOSINOPHIL NFR BLD AUTO: 0.8 % — SIGNIFICANT CHANGE UP (ref 0–6)
GLUCOSE SERPL-MCNC: 89 MG/DL — SIGNIFICANT CHANGE UP (ref 70–99)
HCT VFR BLD CALC: 38.2 % — SIGNIFICANT CHANGE UP (ref 34.5–45)
HGB BLD-MCNC: 12.6 G/DL — SIGNIFICANT CHANGE UP (ref 11.5–15.5)
IMM GRANULOCYTES NFR BLD AUTO: 0.2 % — SIGNIFICANT CHANGE UP (ref 0–1.5)
INR BLD: 1.03 RATIO — SIGNIFICANT CHANGE UP (ref 0.88–1.16)
LIDOCAIN IGE QN: 70 U/L — LOW (ref 73–393)
LYMPHOCYTES # BLD AUTO: 1.04 K/UL — SIGNIFICANT CHANGE UP (ref 1–3.3)
LYMPHOCYTES # BLD AUTO: 21.6 % — SIGNIFICANT CHANGE UP (ref 13–44)
MCHC RBC-ENTMCNC: 28.3 PG — SIGNIFICANT CHANGE UP (ref 27–34)
MCHC RBC-ENTMCNC: 33 GM/DL — SIGNIFICANT CHANGE UP (ref 32–36)
MCV RBC AUTO: 85.7 FL — SIGNIFICANT CHANGE UP (ref 80–100)
MONOCYTES # BLD AUTO: 0.63 K/UL — SIGNIFICANT CHANGE UP (ref 0–0.9)
MONOCYTES NFR BLD AUTO: 13.1 % — SIGNIFICANT CHANGE UP (ref 2–14)
NEUTROPHILS # BLD AUTO: 3.06 K/UL — SIGNIFICANT CHANGE UP (ref 1.8–7.4)
NEUTROPHILS NFR BLD AUTO: 63.5 % — SIGNIFICANT CHANGE UP (ref 43–77)
NRBC # BLD: 0 /100 WBCS — SIGNIFICANT CHANGE UP (ref 0–0)
PLATELET # BLD AUTO: 110 K/UL — LOW (ref 150–400)
POTASSIUM SERPL-MCNC: 3.9 MMOL/L — SIGNIFICANT CHANGE UP (ref 3.5–5.3)
POTASSIUM SERPL-SCNC: 3.9 MMOL/L — SIGNIFICANT CHANGE UP (ref 3.5–5.3)
PROT SERPL-MCNC: 7.4 GM/DL — SIGNIFICANT CHANGE UP (ref 6–8.3)
PROTHROM AB SERPL-ACNC: 11.9 SEC — SIGNIFICANT CHANGE UP (ref 10.6–13.6)
RBC # BLD: 4.46 M/UL — SIGNIFICANT CHANGE UP (ref 3.8–5.2)
RBC # FLD: 16.6 % — HIGH (ref 10.3–14.5)
SARS-COV-2 RNA SPEC QL NAA+PROBE: SIGNIFICANT CHANGE UP
SODIUM SERPL-SCNC: 137 MMOL/L — SIGNIFICANT CHANGE UP (ref 135–145)
TROPONIN I SERPL-MCNC: <.015 NG/ML — SIGNIFICANT CHANGE UP (ref 0.01–0.04)
TROPONIN I SERPL-MCNC: <.015 NG/ML — SIGNIFICANT CHANGE UP (ref 0.01–0.04)
WBC # BLD: 4.82 K/UL — SIGNIFICANT CHANGE UP (ref 3.8–10.5)
WBC # FLD AUTO: 4.82 K/UL — SIGNIFICANT CHANGE UP (ref 3.8–10.5)

## 2020-08-24 PROCEDURE — 71275 CT ANGIOGRAPHY CHEST: CPT | Mod: 26

## 2020-08-24 PROCEDURE — 71046 X-RAY EXAM CHEST 2 VIEWS: CPT | Mod: 26

## 2020-08-24 PROCEDURE — 99285 EMERGENCY DEPT VISIT HI MDM: CPT

## 2020-08-24 PROCEDURE — 93010 ELECTROCARDIOGRAM REPORT: CPT | Mod: 76

## 2020-08-24 RX ORDER — IBUPROFEN 200 MG
1 TABLET ORAL
Qty: 0 | Refills: 0 | DISCHARGE

## 2020-08-24 RX ORDER — MORPHINE SULFATE 50 MG/1
2 CAPSULE, EXTENDED RELEASE ORAL ONCE
Refills: 0 | Status: DISCONTINUED | OUTPATIENT
Start: 2020-08-24 | End: 2020-08-24

## 2020-08-24 RX ORDER — OXYCODONE HYDROCHLORIDE 5 MG/1
5 TABLET ORAL ONCE
Refills: 0 | Status: DISCONTINUED | OUTPATIENT
Start: 2020-08-24 | End: 2020-08-24

## 2020-08-24 RX ORDER — SODIUM CHLORIDE 9 MG/ML
1000 INJECTION INTRAMUSCULAR; INTRAVENOUS; SUBCUTANEOUS ONCE
Refills: 0 | Status: COMPLETED | OUTPATIENT
Start: 2020-08-24 | End: 2020-08-24

## 2020-08-24 RX ORDER — CYCLOBENZAPRINE HYDROCHLORIDE 10 MG/1
0 TABLET, FILM COATED ORAL
Qty: 0 | Refills: 0 | DISCHARGE

## 2020-08-24 RX ORDER — ACETAMINOPHEN 500 MG
2 TABLET ORAL
Qty: 0 | Refills: 0 | DISCHARGE

## 2020-08-24 RX ORDER — ACETAMINOPHEN 500 MG
975 TABLET ORAL ONCE
Refills: 0 | Status: COMPLETED | OUTPATIENT
Start: 2020-08-24 | End: 2020-08-24

## 2020-08-24 RX ORDER — FAMOTIDINE 10 MG/ML
20 INJECTION INTRAVENOUS ONCE
Refills: 0 | Status: COMPLETED | OUTPATIENT
Start: 2020-08-24 | End: 2020-08-24

## 2020-08-24 RX ADMIN — OXYCODONE HYDROCHLORIDE 5 MILLIGRAM(S): 5 TABLET ORAL at 19:24

## 2020-08-24 RX ADMIN — SODIUM CHLORIDE 1000 MILLILITER(S): 9 INJECTION INTRAMUSCULAR; INTRAVENOUS; SUBCUTANEOUS at 14:39

## 2020-08-24 RX ADMIN — FAMOTIDINE 20 MILLIGRAM(S): 10 INJECTION INTRAVENOUS at 18:44

## 2020-08-24 RX ADMIN — Medication 30 MILLILITER(S): at 18:44

## 2020-08-24 RX ADMIN — MORPHINE SULFATE 2 MILLIGRAM(S): 50 CAPSULE, EXTENDED RELEASE ORAL at 16:04

## 2020-08-24 RX ADMIN — MORPHINE SULFATE 2 MILLIGRAM(S): 50 CAPSULE, EXTENDED RELEASE ORAL at 16:34

## 2020-08-24 RX ADMIN — SODIUM CHLORIDE 1000 MILLILITER(S): 9 INJECTION INTRAMUSCULAR; INTRAVENOUS; SUBCUTANEOUS at 14:28

## 2020-08-24 RX ADMIN — Medication 975 MILLIGRAM(S): at 14:59

## 2020-08-24 RX ADMIN — Medication 975 MILLIGRAM(S): at 14:29

## 2020-08-24 NOTE — ED PROVIDER NOTE - PHYSICAL EXAMINATION
VITALS: reviewed  GEN: NAD, A & O x 4  HEAD/EYES: NCAT, PERRL, EOMI, anicteric sclerae, no conjunctival pallor  ENT: mucus membranes moist, oropharynx WNL, trachea midline, no JVD  RESP: lungs CTA with equal breath sounds bilaterally, chest wall nontender and atraumatic  CV: heart with reg rhythm S1, S2,  distal pulses intact and symmetric bilaterally  ABDOMEN: normoactive bowel sounds, soft, nondistended, nontender, no palpable masses  : no CVAT  MSK: extremities atraumatic and nontender, no edema, no asymmetry. the back is without midline or lateral tenderness, there is no spinal deformity or stepoff and the back is ranged painlessly. the neck has no midline tenderness, deformity, or stepoff, and is ranged painlessly.  SKIN: warm, dry, no rash, no bruising, no cyanosis. color appropriate for ethnicity  NEURO: alert, mentating appropriately, no facial asymmetry. gross sensation, motor, coordination are intact  PSYCH: Affect appropriate

## 2020-08-24 NOTE — ED PROVIDER NOTE - NSFOLLOWUPINSTRUCTIONS_ED_ALL_ED_FT
You were seen and evaluated in the emergency department for chest pain. Your exam, laboratory findings, EKG, imaging and other assessments were reassuring. We did not find evidence for a dangerous cause of your pain. This does not mean your pain is not real or concerning, only that we could not find a dangerous or life-threatening cause. Please read the attached information sheets as they will provide useful information regarding your condition.    It is important to understand that while your workup was reassuring, no medical workup can completely exclude all concerning conditions. Therefore, we ask that you please return if you develop new or worsening pain, trouble breathing, fainting (or feel that you might faint), weakness, inability to perform your daily activities, or other concerning new symptoms (such as listed on the attached information sheets. Please read the attached information sheets. Take your medication as prescribed.    Please be sure to follow up with your regular doctor in 2-3 days.     Call to schedule follow up with Vascular regarding Aortic Ectasia  Call to schedule follow up with your cardiologist within one week.     Take Tylenol 650mg (Two 325 mg pills) every 4-6 hours as needed for pain.  Take Pepcid 20 mg twice a day as needed for indigestion.

## 2020-08-24 NOTE — ED PROVIDER NOTE - PATIENT PORTAL LINK FT
You can access the FollowMyHealth Patient Portal offered by [facility] by registering at the following website: [email or web address]. By joining [employer]’s FollowMyHealth portal, you will also be able to view your health information using other applications (apps) compatible with our system.

## 2020-08-24 NOTE — ED PROVIDER NOTE - NSFOLLOWUPCLINICSTOKEN_GEN_ALL_ED_FT
Per Dr Karena Zhang call pt to see if they will do a virtual AWV visit  (AWV has to be video)    I called and spoke to wife explaining Dr Karena Zhang can do a video visit through cell phones since he is due for his AWV and since he does not want to come in because of the coronavirus   She stated she will talk to him when he gets home [phone]:;

## 2020-08-24 NOTE — ED PROVIDER NOTE - NSFOLLOWUPCLINICS_GEN_ALL_ED_FT
[facility]  Cardiology  [address] [place] [id number]  Phone: [phone]  Fax:   Follow Up Time:

## 2020-08-24 NOTE — ED ADULT NURSE NOTE - CAS DISCH ACCOMP BY
Cardiac Rehab Phase I    Activity:  Distance None    Education:  Handouts provided and reviewed: CV Procedure Site Care Handout. Diet: Healthy Cardiac diet reviewed. Disease Process: Disease process reviewed.     Reviewed the following: SITE CARE: Spouse/Significant other

## 2020-08-24 NOTE — ED ADULT NURSE NOTE - OBJECTIVE STATEMENT
Pt c/o left chest pain radiating up left side of neck that began at 530am.  Pt confirms dizziness.  Pt denies difficulty breathing, abd pain, nausea, vomiting, diarrhea, dysuria.

## 2020-08-24 NOTE — ED PROVIDER NOTE - CLINICAL SUMMARY MEDICAL DECISION MAKING FREE TEXT BOX
41 yo F hx DVT presenting with L chest pain after car travel to [place]. High risk for PE, will obtain cardiac labs, CTA. Pt took ASA and ibuprofen at home. r/o pancreatitis, low risk ACS- EKG no ischemic changes, willl obtain XR. Will obtain Covid swab

## 2020-08-24 NOTE — ED ADULT TRIAGE NOTE - CHIEF COMPLAINT QUOTE
left sided chest pains since this morning, patient pointing under her left breast, no sob. Admits to long ride back from [place] yesterday, h/o DVT

## 2020-08-24 NOTE — ED PROVIDER NOTE - CARE PROVIDER_API CALL
VIRGEN WORRELL  Vascular Surgery  [address] [place] [address]  Phone: [phone]  Fax: [phone]  Follow Up Time:

## 2020-08-24 NOTE — ED PROVIDER NOTE - OBJECTIVE STATEMENT
39 yo F hx DVT, presenting with L sided chest pain radiating to L arm and jaw, described as "sticking" pain. Pain woke her up from sleep. No associated nausea/vomiting or diaphoresis. No fevers/chills/cough. No hemoptysis. No history of similar pain. Increased ETOH use this weekend. Not on OCP. Recent car ride to [place]. No current leg pain/swelling. No sick contacts.    No family history early onset CAD, sudden cardiac death.

## 2020-08-27 NOTE — ASU DISCHARGE PLAN (ADULT/PEDIATRIC). - B. DRINK ALCOHOL, BEER, OR WINE
COVID-19 PCR test completed. Patient handout For Patients Who Have Been Tested for Covid-19 (Coronavirus) was given to the patient, which includes test result notification process.     Statement Selected

## 2020-09-02 ENCOUNTER — APPOINTMENT (OUTPATIENT)
Dept: CARDIOLOGY | Facility: CLINIC | Age: 40
End: 2020-09-02

## 2020-09-03 ENCOUNTER — APPOINTMENT (OUTPATIENT)
Dept: OBGYN | Facility: CLINIC | Age: 40
End: 2020-09-03
Payer: COMMERCIAL

## 2020-09-03 VITALS
DIASTOLIC BLOOD PRESSURE: 91 MMHG | HEART RATE: 64 BPM | TEMPERATURE: 97.1 F | BODY MASS INDEX: 32.82 KG/M2 | SYSTOLIC BLOOD PRESSURE: 149 MMHG | WEIGHT: 197 LBS | HEIGHT: 65 IN

## 2020-09-03 DIAGNOSIS — Z48.89 ENCOUNTER FOR OTHER SPECIFIED SURGICAL AFTERCARE: ICD-10-CM

## 2020-09-03 DIAGNOSIS — R35.0 FREQUENCY OF MICTURITION: ICD-10-CM

## 2020-09-03 PROCEDURE — 99396 PREV VISIT EST AGE 40-64: CPT

## 2020-09-03 RX ORDER — IBUPROFEN 800 MG/1
800 TABLET, FILM COATED ORAL 3 TIMES DAILY
Qty: 30 | Refills: 0 | Status: COMPLETED | COMMUNITY
Start: 2019-01-25 | End: 2020-09-03

## 2020-09-03 RX ORDER — IBUPROFEN 800 MG/1
800 TABLET, FILM COATED ORAL EVERY 8 HOURS
Qty: 20 | Refills: 1 | Status: COMPLETED | COMMUNITY
Start: 2019-02-14 | End: 2020-09-03

## 2020-09-03 RX ORDER — MULTIVITAMIN
CAPSULE ORAL
Refills: 0 | Status: ACTIVE | COMMUNITY

## 2020-09-03 NOTE — PHYSICAL EXAM
[Alert] : alert [Awake] : awake [LAD] : no lymphadenopathy [Acute Distress] : no acute distress [Thyroid Nodule] : no thyroid nodule [Goiter] : no goiter [Nipple Discharge] : no nipple discharge [Mass] : no breast mass [Soft] : soft [Tender] : non tender [Axillary LAD] : no axillary lymphadenopathy [Oriented x3] : oriented to person, place, and time [Normal] : uterus [No Bleeding] : there was no active vaginal bleeding [Uterine Adnexae] : were not tender and not enlarged

## 2020-10-02 LAB
BACTERIA UR CULT: NORMAL
CYTOLOGY CVX/VAG DOC THIN PREP: NORMAL
HPV HIGH+LOW RISK DNA PNL CVX: NOT DETECTED

## 2020-10-03 ENCOUNTER — OUTPATIENT (OUTPATIENT)
Dept: OUTPATIENT SERVICES | Facility: HOSPITAL | Age: 40
LOS: 1 days | End: 2020-10-03

## 2020-10-03 ENCOUNTER — APPOINTMENT (OUTPATIENT)
Dept: MAMMOGRAPHY | Facility: CLINIC | Age: 40
End: 2020-10-03

## 2020-10-03 DIAGNOSIS — Z98.51 TUBAL LIGATION STATUS: Chronic | ICD-10-CM

## 2020-10-03 DIAGNOSIS — Z98.890 OTHER SPECIFIED POSTPROCEDURAL STATES: Chronic | ICD-10-CM

## 2020-10-03 DIAGNOSIS — Z00.8 ENCOUNTER FOR OTHER GENERAL EXAMINATION: ICD-10-CM

## 2020-10-03 DIAGNOSIS — K08.409 PARTIAL LOSS OF TEETH, UNSPECIFIED CAUSE, UNSPECIFIED CLASS: Chronic | ICD-10-CM

## 2020-11-13 NOTE — ED ADULT NURSE NOTE - NSFALLRSKPASTHIST_ED_ALL_ED
Spoke with patient via phone.   Last INR 10/30/20 was 1.9.  Dose maintained per protocol.   Today's INR is 2.1 and is within goal range.    Current warfarin dosing verified with patient. Patient was informed that their INR result is within therapeutic range and instructed to maintain current dose per protocol. Discussed dose and return date for next INR.    Dr. Baron is in the office today supervising the treatment.    Patient was instructed to contact the clinic with any unusual bleeding or bruising, any changes in medications, diet, health status, lifestyle, or any other changes, questions or concerns. Patient verbalized understanding of all discussed.      no

## 2020-11-20 ENCOUNTER — APPOINTMENT (OUTPATIENT)
Dept: ORTHOPEDIC SURGERY | Facility: CLINIC | Age: 40
End: 2020-11-20

## 2020-12-10 DIAGNOSIS — Z01.818 ENCOUNTER FOR OTHER PREPROCEDURAL EXAMINATION: ICD-10-CM

## 2020-12-10 DIAGNOSIS — M79.609 PAIN IN UNSPECIFIED LIMB: ICD-10-CM

## 2020-12-12 ENCOUNTER — APPOINTMENT (OUTPATIENT)
Dept: ORTHOPEDIC SURGERY | Facility: CLINIC | Age: 40
End: 2020-12-12
Payer: COMMERCIAL

## 2020-12-12 VITALS
SYSTOLIC BLOOD PRESSURE: 124 MMHG | HEIGHT: 64 IN | WEIGHT: 205 LBS | TEMPERATURE: 97 F | BODY MASS INDEX: 35 KG/M2 | HEART RATE: 69 BPM | DIASTOLIC BLOOD PRESSURE: 78 MMHG

## 2020-12-12 DIAGNOSIS — M25.552 PAIN IN LEFT HIP: ICD-10-CM

## 2020-12-12 DIAGNOSIS — M70.62 TROCHANTERIC BURSITIS, LEFT HIP: ICD-10-CM

## 2020-12-12 PROCEDURE — 99203 OFFICE O/P NEW LOW 30 MIN: CPT | Mod: 25

## 2020-12-12 PROCEDURE — 99072 ADDL SUPL MATRL&STAF TM PHE: CPT

## 2020-12-12 PROCEDURE — 20610 DRAIN/INJ JOINT/BURSA W/O US: CPT | Mod: LT

## 2020-12-12 PROCEDURE — 73502 X-RAY EXAM HIP UNI 2-3 VIEWS: CPT | Mod: LT

## 2020-12-12 RX ADMIN — LIDOCAINE HYDROCHLORIDE 5 %: 10 INJECTION, SOLUTION INFILTRATION; PERINEURAL at 00:00

## 2020-12-12 RX ADMIN — METHYLPREDNISOLONE ACETATE 2 MG/ML: 40 INJECTION, SUSPENSION INTRALESIONAL; INTRAMUSCULAR; INTRASYNOVIAL; SOFT TISSUE at 00:00

## 2020-12-21 PROBLEM — N76.0 BACTERIAL VAGINOSIS: Status: RESOLVED | Noted: 2019-01-25 | Resolved: 2020-12-21

## 2020-12-22 NOTE — ASU PATIENT PROFILE, ADULT - PAIN SCALE PREFERRED, PROFILE
Consent: Written consent was obtained and risks were reviewed including but not limited to scarring, infection, bleeding, scabbing, incomplete removal, nerve damage and allergy to anesthesia. numerical [phone]

## 2021-01-14 RX ORDER — LIDOCAINE HYDROCHLORIDE 10 MG/ML
1 INJECTION, SOLUTION INFILTRATION; PERINEURAL
Refills: 0 | Status: COMPLETED | OUTPATIENT
Start: 2020-12-12

## 2021-01-14 RX ORDER — METHYLPRED ACET/NACL,ISO-OS/PF 40 MG/ML
40 VIAL (ML) INJECTION
Qty: 1 | Refills: 0 | Status: COMPLETED | OUTPATIENT
Start: 2020-12-12

## 2021-02-08 ENCOUNTER — NON-APPOINTMENT (OUTPATIENT)
Age: 41
End: 2021-02-08

## 2021-02-24 ENCOUNTER — APPOINTMENT (OUTPATIENT)
Dept: OBGYN | Facility: CLINIC | Age: 41
End: 2021-02-24
Payer: COMMERCIAL

## 2021-02-24 VITALS
SYSTOLIC BLOOD PRESSURE: 143 MMHG | BODY MASS INDEX: 33.8 KG/M2 | DIASTOLIC BLOOD PRESSURE: 88 MMHG | HEART RATE: 71 BPM | WEIGHT: 198 LBS | HEIGHT: 64 IN | TEMPERATURE: 97.8 F

## 2021-02-24 VITALS — SYSTOLIC BLOOD PRESSURE: 127 MMHG | DIASTOLIC BLOOD PRESSURE: 81 MMHG

## 2021-02-24 DIAGNOSIS — R10.2 PELVIC AND PERINEAL PAIN: ICD-10-CM

## 2021-02-24 PROCEDURE — 99072 ADDL SUPL MATRL&STAF TM PHE: CPT

## 2021-02-24 PROCEDURE — 99213 OFFICE O/P EST LOW 20 MIN: CPT

## 2021-02-25 ENCOUNTER — APPOINTMENT (OUTPATIENT)
Dept: OBGYN | Facility: CLINIC | Age: 41
End: 2021-02-25
Payer: COMMERCIAL

## 2021-02-25 ENCOUNTER — ASOB RESULT (OUTPATIENT)
Age: 41
End: 2021-02-25

## 2021-02-25 PROCEDURE — 76830 TRANSVAGINAL US NON-OB: CPT

## 2021-02-25 PROCEDURE — 99072 ADDL SUPL MATRL&STAF TM PHE: CPT

## 2021-03-05 LAB — BACTERIA UR CULT: NORMAL

## 2021-03-05 NOTE — HISTORY OF PRESENT ILLNESS
[FreeTextEntry1] : 41yo P2 pulling sensation in suprapubic area for past few weeks.  AT worse discomfort is 8/10.  Pain is worse with intercourse.  No d/c.   No urinary sx.

## 2021-03-05 NOTE — PHYSICAL EXAM
[FreeTextEntry3] : ?1.5-2cm cyst anterior vaginal wall with point tenderness over area; no d/c [FreeTextEntry4] : ?1.5-2cm cyst anterior vaginal wall with point tenderness over area; no d/c

## 2021-03-09 ENCOUNTER — APPOINTMENT (OUTPATIENT)
Dept: ORTHOPEDIC SURGERY | Facility: CLINIC | Age: 41
End: 2021-03-09

## 2021-03-12 ENCOUNTER — APPOINTMENT (OUTPATIENT)
Dept: UROLOGY | Facility: CLINIC | Age: 41
End: 2021-03-12
Payer: COMMERCIAL

## 2021-03-12 PROCEDURE — 99204 OFFICE O/P NEW MOD 45 MIN: CPT

## 2021-03-12 PROCEDURE — 99072 ADDL SUPL MATRL&STAF TM PHE: CPT

## 2021-03-12 NOTE — REVIEW OF SYSTEMS
[Painful [place]] : painful [place] [Wake up at night to urinate  How many times?  ___] : wakes up to urinate [unfilled] times during the night [Strong urge to urinate] : strong urge to urinate [Bladder pressure] : experiences bladder pressure [Strain or push to urinate] : strain or push to urinate [Bladder fullness after urinating] : bladder fullness after urinating [Leakage of urine with urgency] : leakage of urine with urgency [Negative] : Heme/Lymph

## 2021-03-12 NOTE — ASSESSMENT
[FreeTextEntry1] : Urethral diverticulum by history and exam but US reports solid lesion. Needs eval with MRI. Discussed surgical management of a tic\par --MRI\par --Refer to Dr Fermin for excision of tic pending MRI results

## 2021-03-12 NOTE — HISTORY OF PRESENT ILLNESS
[FreeTextEntry1] : 39yo female with cc of ? urethral diverticulum. Pt reports that she has had a bothersome pulling sensation for several months. SHe has new dyspareunia over the last couple months as well as urinary dribbling. SHe did notice a small bulge since Nov? time frame. Sx have become much worse over the last month or so. SHe went to GYN and had TV US that showed 1.7cm "solid mass in anterior vaginal wall". \par \par Hx of tubal ligation.

## 2021-03-12 NOTE — REVIEW OF SYSTEMS
[Painful [place]] : painful [place] [Wake up at night to urinate  How many times?  ___] : wakes up to urinate [unfilled] times during the night [Strong urge to urinate] : strong urge to urinate [Bladder pressure] : experiences bladder pressure [Strain or push to urinate] : strain or push to urinate [Bladder fullness after urinating] : bladder fullness after urinating [Leakage of urine with urgency] : leakage of urine with urgency [Negative] : Heme/Lymph

## 2021-03-12 NOTE — PHYSICAL EXAM
[General Appearance - Well Developed] : well developed [General Appearance - Well Nourished] : well nourished [General Appearance - In No Acute Distress] : no acute distress [Edema] : no peripheral edema [Exaggerated Use Of Accessory Muscles For Inspiration] : no accessory muscle use [Abdomen Soft] : soft [Abdomen Tenderness] : non-tender [Costovertebral Angle Tenderness] : no ~M costovertebral angle tenderness [Urethral Meatus] : the meatus of the urethra showed no abnormalities [Vagina] : normal vaginal exam [FreeTextEntry1] : palpable tic just L of midline prox to distal, mild tenderness, unable to express urine [Normal Station and Gait] : the gait and station were normal for the patient's age [Skin Color & Pigmentation] : normal skin color and pigmentation [No Focal Deficits] : no focal deficits [Oriented To Time, Place, And Person] : oriented to person, place, and time [Cervical Lymph Nodes Enlarged Anterior Bilaterally] : anterior cervical [Supraclavicular Lymph Nodes Enlarged Bilaterally] : supraclavicular

## 2021-03-12 NOTE — HISTORY OF PRESENT ILLNESS
[FreeTextEntry1] : 41yo female with cc of ? urethral diverticulum. Pt reports that she has had a bothersome pulling sensation for several months. SHe has new dyspareunia over the last couple months as well as urinary dribbling. SHe did notice a small bulge since Nov? time frame. Sx have become much worse over the last month or so. SHe went to GYN and had TV US that showed 1.7cm "solid mass in anterior vaginal wall". \par \par Hx of tubal ligation.

## 2021-03-15 LAB
APPEARANCE: CLEAR
BACTERIA UR CULT: NORMAL
BACTERIA: NEGATIVE
BILIRUBIN URINE: NEGATIVE
BLOOD URINE: NEGATIVE
COLOR: YELLOW
GLUCOSE QUALITATIVE U: NEGATIVE
HYALINE CASTS: 2 /LPF
KETONES URINE: NEGATIVE
LEUKOCYTE ESTERASE URINE: NEGATIVE
MICROSCOPIC-UA: NORMAL
NITRITE URINE: NEGATIVE
PH URINE: 7.5
PROTEIN URINE: NORMAL
RED BLOOD CELLS URINE: 6 /HPF
SPECIFIC GRAVITY URINE: 1.02
SQUAMOUS EPITHELIAL CELLS: 3 /HPF
URINE CYTOLOGY: NORMAL
UROBILINOGEN URINE: NORMAL
WHITE BLOOD CELLS URINE: 0 /HPF

## 2021-03-18 ENCOUNTER — APPOINTMENT (OUTPATIENT)
Dept: UROLOGY | Facility: CLINIC | Age: 41
End: 2021-03-18

## 2021-04-03 ENCOUNTER — APPOINTMENT (OUTPATIENT)
Dept: MRI IMAGING | Facility: IMAGING CENTER | Age: 41
End: 2021-04-03
Payer: COMMERCIAL

## 2021-04-03 ENCOUNTER — OUTPATIENT (OUTPATIENT)
Dept: OUTPATIENT SERVICES | Facility: HOSPITAL | Age: 41
LOS: 1 days | End: 2021-04-03
Payer: MEDICAID

## 2021-04-03 DIAGNOSIS — Z98.890 OTHER SPECIFIED POSTPROCEDURAL STATES: Chronic | ICD-10-CM

## 2021-04-03 DIAGNOSIS — K08.409 PARTIAL LOSS OF TEETH, UNSPECIFIED CAUSE, UNSPECIFIED CLASS: Chronic | ICD-10-CM

## 2021-04-03 DIAGNOSIS — N36.1 URETHRAL DIVERTICULUM: ICD-10-CM

## 2021-04-03 DIAGNOSIS — Z00.8 ENCOUNTER FOR OTHER GENERAL EXAMINATION: ICD-10-CM

## 2021-04-03 DIAGNOSIS — N89.8 OTHER SPECIFIED NONINFLAMMATORY DISORDERS OF VAGINA: ICD-10-CM

## 2021-04-03 DIAGNOSIS — Z98.51 TUBAL LIGATION STATUS: Chronic | ICD-10-CM

## 2021-04-03 PROCEDURE — A9585: CPT

## 2021-04-03 PROCEDURE — 72197 MRI PELVIS W/O & W/DYE: CPT

## 2021-04-03 PROCEDURE — 72197 MRI PELVIS W/O & W/DYE: CPT | Mod: 26

## 2021-04-15 ENCOUNTER — APPOINTMENT (OUTPATIENT)
Dept: UROLOGY | Facility: CLINIC | Age: 41
End: 2021-04-15
Payer: COMMERCIAL

## 2021-04-15 VITALS
BODY MASS INDEX: 33.8 KG/M2 | DIASTOLIC BLOOD PRESSURE: 69 MMHG | SYSTOLIC BLOOD PRESSURE: 111 MMHG | RESPIRATION RATE: 17 BRPM | HEART RATE: 79 BPM | TEMPERATURE: 97.8 F | WEIGHT: 198 LBS | HEIGHT: 64 IN

## 2021-04-15 DIAGNOSIS — N36.8 OTHER SPECIFIED DISORDERS OF URETHRA: ICD-10-CM

## 2021-04-15 DIAGNOSIS — N36.1 URETHRAL DIVERTICULUM: ICD-10-CM

## 2021-04-15 DIAGNOSIS — K59.00 CONSTIPATION, UNSPECIFIED: ICD-10-CM

## 2021-04-15 PROCEDURE — 99072 ADDL SUPL MATRL&STAF TM PHE: CPT

## 2021-04-15 PROCEDURE — 99215 OFFICE O/P EST HI 40 MIN: CPT

## 2021-04-15 RX ORDER — DICLOFENAC SODIUM 75 MG/1
75 TABLET, DELAYED RELEASE ORAL
Qty: 60 | Refills: 1 | Status: COMPLETED | COMMUNITY
Start: 2020-12-12 | End: 2021-04-15

## 2021-04-15 RX ORDER — AMLODIPINE BESYLATE 5 MG/1
5 TABLET ORAL
Refills: 0 | Status: COMPLETED | COMMUNITY
End: 2021-04-15

## 2021-04-15 RX ORDER — ASPIRIN 81 MG
81 TABLET, DELAYED RELEASE (ENTERIC COATED) ORAL
Refills: 0 | Status: COMPLETED | COMMUNITY
End: 2021-04-15

## 2021-04-15 NOTE — HISTORY OF PRESENT ILLNESS
[FreeTextEntry1] : 40 year old female patient referred by Dr. Mares for evaluation of possible Urethral Diverticulum. \par \par Patient unable to recall how long the "mass" has been present, but she recalls that it was 2021 when she noticed pain with intercourse and felt "pressured". \par \par U/S in  showed 7 x 1.6cm solid appearing mass in the left anterior vaginal wall.\par \par MRI in Mar 21 revealed no urethral perry or diverticulum. \par \par Patient has long history of constipation - in average of BM in every 2-3 days. \par \par Daily Fluid intake includes 1 cup of coffee and 2 x 27 oZ of water. no wine, wenceslao or tea. \par \par patient has hx of 3 termination of pregnancies, has 2 kids both . \par \par Surgical hx: tube ligation\par \par Family hx: Mom has breast cancer  \par \par PVR - 14mL

## 2021-04-15 NOTE — PHYSICAL EXAM
[General Appearance - Well Developed] : well developed [Normal Appearance] : normal appearance [Abdomen Tenderness] : non-tender [External Female Genitalia] : normal external genitalia [Vagina] : normal vaginal exam [Skin Color & Pigmentation] : normal skin color and pigmentation [Not Anxious] : not anxious [Normal Station and Gait] : the gait and station were normal for the patient's age [No Focal Deficits] : no focal deficits [FreeTextEntry1] : mass was felt at the urethral wall. tender to palpation.  Midline, firm, not easily compressible, no expressed urine per urethra, neg cst

## 2021-04-15 NOTE — LETTER BODY
[Dear  ___] : Dear  [unfilled], [Consult Letter:] : I had the pleasure of evaluating your patient, [unfilled]. [Please see my note below.] : Please see my note below. [Consult Closing:] : Thank you very much for allowing me to participate in the care of this patient.  If you have any questions, please do not hesitate to contact me. [Sincerely,] : Sincerely, [DrSin  ___] : Dr. VINCENT [DrSin ___] : Dr. VINCENT [FreeTextEntry1] : We had a productive visit.\par On examination, she certainly has a tender anterior vaginal wall mass overlying her distal urethra.\par Despite MRI findings of no visible mass or diverticulum, we will proceed with a cystoscopy.\par We also discussed surgical options in the future, including urethral diverticulectomy vs excision of [place]'s glad cyst.\par We will decide after the cystoscopy. \par  [FreeTextEntry3] : Dr. Joey Fermin\par Urology\par Voiding Dysfunction, Female Pelvic Medicine, & Reconstructive Surgery\par

## 2021-04-15 NOTE — ASSESSMENT
[FreeTextEntry1] : Plan: \par \par - PVR: 14mL\par \par - pending schedule (4/29/21) for Cystoscopy in office\par \par - Surgical plans discussed with patient: urethral diverticulectomy VS.  excision of the vaginal skene gland cyst\par \par - Encouraged patient to increase daily fluid and fruit intake (4 servings/day). Recommended Gummie Fiber 2 gummies per day. \par \par - U/A and urine culture sent

## 2021-04-15 NOTE — END OF VISIT
[Time Spent: ___ minutes] : I have spent [unfilled] minutes of time on the encounter. [FreeTextEntry3] : Counseled the patient on constipation and how it can affect the urinary tract. We discussed increasing water intake, daily fruits and vegetables, and sources of fiber.  We recommended 4 servings of whole fruit per day, excluding dried fruit or juices.  We also recommended supplementing soluble fiber intake with gummy fiber #2/day.\par \par We discussed the r/b/a of an Excision of Vaginal Periurethral Cyst, possible urethral reconstruction, Cystoscopy.\par We disuccsed the potential risks of bleeding, infection, and pain. Recurrence of cyst. Possible need for urinary catheter if the cyst involves the urethra. Small but possible risks that there are malignant components to the cyst. We also discussed the possibility of scar formation and urinary incontinence as rare risks.  Final decision for surgery will be made after office cystoscopy. \par

## 2021-04-19 LAB
APPEARANCE: CLEAR
BACTERIA UR CULT: NORMAL
BACTERIA: NEGATIVE
BILIRUBIN URINE: NEGATIVE
BLOOD URINE: NEGATIVE
COLOR: YELLOW
GLUCOSE QUALITATIVE U: NEGATIVE
HYALINE CASTS: 1 /LPF
KETONES URINE: NEGATIVE
LEUKOCYTE ESTERASE URINE: NEGATIVE
MICROSCOPIC-UA: NORMAL
NITRITE URINE: NEGATIVE
PH URINE: 6.5
PROTEIN URINE: NORMAL
RED BLOOD CELLS URINE: 4 /HPF
SPECIFIC GRAVITY URINE: 1.03
SQUAMOUS EPITHELIAL CELLS: 1 /HPF
UROBILINOGEN URINE: ABNORMAL
WHITE BLOOD CELLS URINE: 0 /HPF

## 2021-04-29 ENCOUNTER — APPOINTMENT (OUTPATIENT)
Dept: UROLOGY | Facility: CLINIC | Age: 41
End: 2021-04-29
Payer: COMMERCIAL

## 2021-04-29 ENCOUNTER — OUTPATIENT (OUTPATIENT)
Dept: OUTPATIENT SERVICES | Facility: HOSPITAL | Age: 41
LOS: 1 days | End: 2021-04-29
Payer: MEDICAID

## 2021-04-29 ENCOUNTER — APPOINTMENT (OUTPATIENT)
Dept: UROGYNECOLOGY | Facility: CLINIC | Age: 41
End: 2021-04-29

## 2021-04-29 VITALS — DIASTOLIC BLOOD PRESSURE: 95 MMHG | HEART RATE: 89 BPM | SYSTOLIC BLOOD PRESSURE: 163 MMHG | TEMPERATURE: 98 F

## 2021-04-29 DIAGNOSIS — Z98.890 OTHER SPECIFIED POSTPROCEDURAL STATES: Chronic | ICD-10-CM

## 2021-04-29 DIAGNOSIS — N89.8 OTHER SPECIFIED NONINFLAMMATORY DISORDERS OF VAGINA: ICD-10-CM

## 2021-04-29 DIAGNOSIS — N36.8 OTHER SPECIFIED DISORDERS OF URETHRA: ICD-10-CM

## 2021-04-29 DIAGNOSIS — K08.409 PARTIAL LOSS OF TEETH, UNSPECIFIED CAUSE, UNSPECIFIED CLASS: Chronic | ICD-10-CM

## 2021-04-29 DIAGNOSIS — Z98.51 TUBAL LIGATION STATUS: Chronic | ICD-10-CM

## 2021-04-29 DIAGNOSIS — R35.0 FREQUENCY OF MICTURITION: ICD-10-CM

## 2021-04-29 PROCEDURE — 52000 CYSTOURETHROSCOPY: CPT

## 2021-04-29 PROCEDURE — 99072 ADDL SUPL MATRL&STAF TM PHE: CPT

## 2021-04-29 PROCEDURE — 99214 OFFICE O/P EST MOD 30 MIN: CPT | Mod: 25

## 2021-04-29 NOTE — ASSESSMENT
[FreeTextEntry1] : Eben is a 41 yo with vaginal mass interested in surgical management.  Discussed her cystoscopy today which was normal and with no evidence of urethral diverticulum, however explained to patient that there still may be a connection with the urethra that we were unable to visualize today.  Reviewed the results of her prior MRI and her pelvic ultrasound.  Discussed differential for mass including congenital cyst, [place]'s cyst or Monica's cyst or potential urethral diverticulum.  Most likely etiology is a [place]'s cyst. \par \par  Offered patient options of expectant management, sitz baths and conservative management or surgical management.  She is interested in surgical intervention.  \par \par Discussed a vaginal approach with surgical exploration and excision of cyst.  Risks and benefits of surgical intervention discussed.  Patient understands risks of damage to nearby organs, cyst recurrence, urinary changes, infection, bleeding among other potential complications.   If there is a connection with urethra, discussed need for patient to go home with catheter.  \par \par Post operative care, restrictions and precautions discussed.  Patient remains interest in surgical intervention and will be scheduled for surgery.  All questions answered.\par

## 2021-04-29 NOTE — PHYSICAL EXAM
[General Appearance - Well Developed] : well developed [General Appearance - Well Nourished] : well nourished [Normal Appearance] : normal appearance [Well Groomed] : well groomed [General Appearance - In No Acute Distress] : no acute distress [Abdomen Soft] : soft [Urethral Meatus] : normal urethra [Urinary Bladder Findings] : the bladder was normal on palpation [External Female Genitalia] : normal external genitalia [Skin Color & Pigmentation] : normal skin color and pigmentation [] : no respiratory distress [Mood] : the mood was normal [Not Anxious] : not anxious [Normal Station and Gait] : the gait and station were normal for the patient's age

## 2021-04-29 NOTE — END OF VISIT
[] : Fellow [FreeTextEntry3] : to OR for vaginal cyst excision, possible urethral reconstruction, cystoscopy  [Time Spent: ___ minutes] : I have spent [unfilled] minutes of time on the encounter.

## 2021-04-29 NOTE — HISTORY OF PRESENT ILLNESS
[FreeTextEntry1] : Eben is a 41 yo with vaginal mass interested in surgical management.  She presented today for cystoscopy to evaluate for urethral diverticulum.  Cystoscopy today normal, no evidence of diverticulum.  Patient symptoms have not changed.  She remains bothered by mass, discomfort and has pain with intercourse.  She is interested in surgical management.  \par \par PMH: PID, Adenomyosis\par PSH: BTL,  section, TOP, breast reduction, hand surgery, hydrothermal ablation of endometrium

## 2021-04-30 ENCOUNTER — OUTPATIENT (OUTPATIENT)
Dept: OUTPATIENT SERVICES | Facility: HOSPITAL | Age: 41
LOS: 1 days | End: 2021-04-30
Payer: COMMERCIAL

## 2021-04-30 VITALS
RESPIRATION RATE: 14 BRPM | SYSTOLIC BLOOD PRESSURE: 120 MMHG | OXYGEN SATURATION: 100 % | HEIGHT: 64 IN | DIASTOLIC BLOOD PRESSURE: 79 MMHG | WEIGHT: 195.11 LBS | TEMPERATURE: 98 F | HEART RATE: 65 BPM

## 2021-04-30 DIAGNOSIS — K08.409 PARTIAL LOSS OF TEETH, UNSPECIFIED CAUSE, UNSPECIFIED CLASS: Chronic | ICD-10-CM

## 2021-04-30 DIAGNOSIS — N36.8 OTHER SPECIFIED DISORDERS OF URETHRA: ICD-10-CM

## 2021-04-30 DIAGNOSIS — I80.229 PHLEBITIS AND THROMBOPHLEBITIS OF UNSPECIFIED POPLITEAL VEIN: ICD-10-CM

## 2021-04-30 DIAGNOSIS — Z98.51 TUBAL LIGATION STATUS: Chronic | ICD-10-CM

## 2021-04-30 DIAGNOSIS — Z98.890 OTHER SPECIFIED POSTPROCEDURAL STATES: Chronic | ICD-10-CM

## 2021-04-30 LAB
ANION GAP SERPL CALC-SCNC: 16 MMOL/L — SIGNIFICANT CHANGE UP (ref 5–17)
BUN SERPL-MCNC: 10 MG/DL — SIGNIFICANT CHANGE UP (ref 7–23)
CALCIUM SERPL-MCNC: 9.3 MG/DL — SIGNIFICANT CHANGE UP (ref 8.4–10.5)
CHLORIDE SERPL-SCNC: 100 MMOL/L — SIGNIFICANT CHANGE UP (ref 96–108)
CO2 SERPL-SCNC: 22 MMOL/L — SIGNIFICANT CHANGE UP (ref 22–31)
CREAT SERPL-MCNC: 0.83 MG/DL — SIGNIFICANT CHANGE UP (ref 0.5–1.3)
GLUCOSE SERPL-MCNC: 89 MG/DL — SIGNIFICANT CHANGE UP (ref 70–99)
HCT VFR BLD CALC: 40.2 % — SIGNIFICANT CHANGE UP (ref 34.5–45)
HGB BLD-MCNC: 12.9 G/DL — SIGNIFICANT CHANGE UP (ref 11.5–15.5)
MCHC RBC-ENTMCNC: 28.4 PG — SIGNIFICANT CHANGE UP (ref 27–34)
MCHC RBC-ENTMCNC: 32.1 GM/DL — SIGNIFICANT CHANGE UP (ref 32–36)
MCV RBC AUTO: 88.4 FL — SIGNIFICANT CHANGE UP (ref 80–100)
NRBC # BLD: 0 /100 WBCS — SIGNIFICANT CHANGE UP (ref 0–0)
PLATELET # BLD AUTO: 155 K/UL — SIGNIFICANT CHANGE UP (ref 150–400)
POTASSIUM SERPL-MCNC: 3.4 MMOL/L — LOW (ref 3.5–5.3)
POTASSIUM SERPL-SCNC: 3.4 MMOL/L — LOW (ref 3.5–5.3)
RBC # BLD: 4.55 M/UL — SIGNIFICANT CHANGE UP (ref 3.8–5.2)
RBC # FLD: 14.6 % — HIGH (ref 10.3–14.5)
SODIUM SERPL-SCNC: 138 MMOL/L — SIGNIFICANT CHANGE UP (ref 135–145)
WBC # BLD: 5.19 K/UL — SIGNIFICANT CHANGE UP (ref 3.8–10.5)
WBC # FLD AUTO: 5.19 K/UL — SIGNIFICANT CHANGE UP (ref 3.8–10.5)

## 2021-04-30 PROCEDURE — 85027 COMPLETE CBC AUTOMATED: CPT

## 2021-04-30 PROCEDURE — 80048 BASIC METABOLIC PNL TOTAL CA: CPT

## 2021-04-30 PROCEDURE — G0463: CPT

## 2021-04-30 RX ORDER — SODIUM CHLORIDE 9 MG/ML
3 INJECTION INTRAMUSCULAR; INTRAVENOUS; SUBCUTANEOUS EVERY 8 HOURS
Refills: 0 | Status: DISCONTINUED | OUTPATIENT
Start: 2021-05-04 | End: 2021-05-04

## 2021-04-30 RX ORDER — LIDOCAINE HCL 20 MG/ML
0.2 VIAL (ML) INJECTION ONCE
Refills: 0 | Status: DISCONTINUED | OUTPATIENT
Start: 2021-05-04 | End: 2021-05-04

## 2021-04-30 RX ORDER — ASPIRIN/CALCIUM CARB/MAGNESIUM 324 MG
1 TABLET ORAL
Qty: 0 | Refills: 0 | DISCHARGE

## 2021-04-30 RX ORDER — CIPROFLOXACIN LACTATE 400MG/40ML
400 VIAL (ML) INTRAVENOUS ONCE
Refills: 0 | Status: DISCONTINUED | OUTPATIENT
Start: 2021-05-04 | End: 2021-05-04

## 2021-04-30 NOTE — H&P PST ADULT - HISTORY OF PRESENT ILLNESS
39 yr old female with no significant medical hx presents for preop evaluation with c/o heavy long menstruation.  Pt is now scheduled for Dilation and Curettage Hysteroscopy with HTA Ablation on 09/13/19. 40 yr old female with PMH of Obesity (BMI:  33.5), DVT Left LE in 2018( treated with Xarelto X 4 months: no other sequela), Covid 19 (never hospitalized), H/O reduction mammoplasty 7/2011, H/O tubal ligation 1/2019, S/P arthroscopy of shoulder left 01/2016 and 12/2016, D&C 2019. Pt c/o pain, pressure after urination since Jan. 2021. Pt evaluated by Dr. Fermin for a scheduled Cystoscopy, Excision of Periurethral [place]'s gland cyst on 5/4/21. Pt denies recent travel or sick contact.   Of note: + Urine C&S done at Dr. Fermin's: will start a 3 day course of Bactrim DS on 5/1-5/3  **Covid swab on 5/1/21 at [facility]

## 2021-04-30 NOTE — H&P PST ADULT - NSICDXPROBLEM_GEN_ALL_CORE_FT
PROBLEM DIAGNOSES  Problem: Other specified disorders of urethra  Assessment and Plan: -scheduled Cystoscopy, Excision of periurethral skene's gland cyst on 5/4/21  -preop instructions given  -Labs: CBC, BMP, Urine C&S doen in PST

## 2021-04-30 NOTE — H&P PST ADULT - NSICDXPASTMEDICALHX_GEN_ALL_CORE_FT
PAST MEDICAL HISTORY:  DVT, lower extremity August 2018 left - treated with Xarelto x 4 months    Murmur, heart Functional    Obesity     Vaginal bleeding      PAST MEDICAL HISTORY:  COVID-19 flu like symptoms, never hospitalized    DVT, lower extremity August 2018 left - treated with Xarelto x 4 months    Murmur, heart Functional    Obesity     Vaginal bleeding

## 2021-04-30 NOTE — H&P PST ADULT - NSICDXPASTSURGICALHX_GEN_ALL_CORE_FT
PAST SURGICAL HISTORY:  H/O reduction mammoplasty 2011    H/O tubal ligation 2019    History of  X 2 2000    repair of right pinky-tendon 2006  ? flexor tendon    S/P arthroscopy of shoulder left 2016 and 2016    S/P tooth extraction     Status post D&C 2019

## 2021-05-01 ENCOUNTER — OUTPATIENT (OUTPATIENT)
Dept: OUTPATIENT SERVICES | Facility: HOSPITAL | Age: 41
LOS: 1 days | End: 2021-05-01
Payer: COMMERCIAL

## 2021-05-01 DIAGNOSIS — Z98.890 OTHER SPECIFIED POSTPROCEDURAL STATES: Chronic | ICD-10-CM

## 2021-05-01 DIAGNOSIS — K08.409 PARTIAL LOSS OF TEETH, UNSPECIFIED CAUSE, UNSPECIFIED CLASS: Chronic | ICD-10-CM

## 2021-05-01 DIAGNOSIS — Z98.51 TUBAL LIGATION STATUS: Chronic | ICD-10-CM

## 2021-05-01 DIAGNOSIS — Z11.52 ENCOUNTER FOR SCREENING FOR COVID-19: ICD-10-CM

## 2021-05-01 LAB — SARS-COV-2 RNA SPEC QL NAA+PROBE: SIGNIFICANT CHANGE UP

## 2021-05-01 PROCEDURE — U0005: CPT

## 2021-05-01 PROCEDURE — U0003: CPT

## 2021-05-01 PROCEDURE — C9803: CPT

## 2021-05-03 ENCOUNTER — TRANSCRIPTION ENCOUNTER (OUTPATIENT)
Age: 41
End: 2021-05-03

## 2021-05-04 ENCOUNTER — INPATIENT (INPATIENT)
Facility: HOSPITAL | Age: 41
LOS: 0 days | Discharge: HOME | DRG: 747 | End: 2021-05-04
Attending: UROLOGY | Admitting: UROLOGY
Payer: COMMERCIAL

## 2021-05-04 ENCOUNTER — APPOINTMENT (OUTPATIENT)
Dept: UROLOGY | Facility: HOSPITAL | Age: 41
End: 2021-05-04

## 2021-05-04 VITALS
HEIGHT: 64 IN | WEIGHT: 195.11 LBS | RESPIRATION RATE: 18 BRPM | DIASTOLIC BLOOD PRESSURE: 83 MMHG | SYSTOLIC BLOOD PRESSURE: 137 MMHG | TEMPERATURE: 98 F | OXYGEN SATURATION: 100 % | HEART RATE: 56 BPM

## 2021-05-04 VITALS
SYSTOLIC BLOOD PRESSURE: 137 MMHG | RESPIRATION RATE: 17 BRPM | OXYGEN SATURATION: 100 % | HEART RATE: 80 BPM | DIASTOLIC BLOOD PRESSURE: 67 MMHG

## 2021-05-04 DIAGNOSIS — N36.8 OTHER SPECIFIED DISORDERS OF URETHRA: ICD-10-CM

## 2021-05-04 DIAGNOSIS — Z98.890 OTHER SPECIFIED POSTPROCEDURAL STATES: Chronic | ICD-10-CM

## 2021-05-04 DIAGNOSIS — Z98.51 TUBAL LIGATION STATUS: Chronic | ICD-10-CM

## 2021-05-04 DIAGNOSIS — K08.409 PARTIAL LOSS OF TEETH, UNSPECIFIED CAUSE, UNSPECIFIED CLASS: Chronic | ICD-10-CM

## 2021-05-04 PROCEDURE — 52000 CYSTOURETHROSCOPY: CPT

## 2021-05-04 RX ORDER — AZTREONAM 2 G
1 VIAL (EA) INJECTION
Qty: 0 | Refills: 0 | DISCHARGE

## 2021-05-04 RX ORDER — HYDROMORPHONE HYDROCHLORIDE 2 MG/ML
0.5 INJECTION INTRAMUSCULAR; INTRAVENOUS; SUBCUTANEOUS
Refills: 0 | Status: DISCONTINUED | OUTPATIENT
Start: 2021-05-04 | End: 2021-05-04

## 2021-05-04 RX ORDER — FAMOTIDINE 10 MG/ML
20 INJECTION INTRAVENOUS ONCE
Refills: 0 | Status: COMPLETED | OUTPATIENT
Start: 2021-05-04 | End: 2021-05-04

## 2021-05-04 RX ORDER — OXYCODONE HYDROCHLORIDE 5 MG/1
1 TABLET ORAL
Qty: 6 | Refills: 0
Start: 2021-05-04 | End: 2021-05-05

## 2021-05-04 RX ORDER — ONDANSETRON 8 MG/1
4 TABLET, FILM COATED ORAL ONCE
Refills: 0 | Status: COMPLETED | OUTPATIENT
Start: 2021-05-04 | End: 2021-05-04

## 2021-05-04 RX ORDER — DIPHENHYDRAMINE HCL 50 MG
12.5 CAPSULE ORAL ONCE
Refills: 0 | Status: COMPLETED | OUTPATIENT
Start: 2021-05-04 | End: 2021-05-04

## 2021-05-04 RX ADMIN — FAMOTIDINE 20 MILLIGRAM(S): 10 INJECTION INTRAVENOUS at 13:55

## 2021-05-04 RX ADMIN — ONDANSETRON 4 MILLIGRAM(S): 8 TABLET, FILM COATED ORAL at 13:07

## 2021-05-04 RX ADMIN — Medication 12.5 MILLIGRAM(S): at 13:56

## 2021-05-04 NOTE — ASU DISCHARGE PLAN (ADULT/PEDIATRIC) - ACTIVITY LEVEL
No heavy lifting No heavy lifting/Nothing per vagina/No tub baths/No douching/No tampons/No intercourse

## 2021-05-04 NOTE — ASU DISCHARGE PLAN (ADULT/PEDIATRIC) - CARE PROVIDER_API CALL
Joey Fermin)  Urology  [address] [place] [address]  Phone: [phone]  Fax: [phone]  Follow Up Time: 2 weeks

## 2021-05-04 NOTE — BRIEF OPERATIVE NOTE - OPERATION/FINDINGS
Exam under anesthesia performed and vaginal exploration performed with no evidence of vaginal cyst. Images reviewed again in the operating room and normal vaginal tissue apparent. Cystoscopy performed with no evidence of a connection, no suture, tumor, injury or foreign body noted within the bladder.

## 2021-05-04 NOTE — ASU DISCHARGE PLAN (ADULT/PEDIATRIC) - ASU DC SPECIAL INSTRUCTIONSFT
Take tylenol and ibuprofen as needed for pain. For severe pain, can take oxycodone.     If requiring Oxycodone for pain, take over the counter stool softeners as needed such as senna, colace, or miralax.    Follow-up in 2 weeks. No baths or swimming for 1 week (showers only). No intercourse until after follow-up.

## 2021-05-04 NOTE — BRIEF OPERATIVE NOTE - NSICDXBRIEFPROCEDURE_GEN_ALL_CORE_FT
PROCEDURES:  Exam under anesthesia, pelvic 04-May-2021 11:24:00  Dusty Whittaker  Exploration of vaginal wall 04-May-2021 11:24:09  Dusty Whittaker  Rigid cystoscopy 04-May-2021 11:25:06  Dusty Whittaker

## 2021-05-05 PROBLEM — U07.1 COVID-19: Chronic | Status: ACTIVE | Noted: 2021-04-30

## 2021-05-20 ENCOUNTER — APPOINTMENT (OUTPATIENT)
Dept: UROLOGY | Facility: CLINIC | Age: 41
End: 2021-05-20
Payer: COMMERCIAL

## 2021-05-20 VITALS
RESPIRATION RATE: 17 BRPM | HEIGHT: 64 IN | WEIGHT: 198 LBS | BODY MASS INDEX: 33.8 KG/M2 | DIASTOLIC BLOOD PRESSURE: 95 MMHG | HEART RATE: 65 BPM | TEMPERATURE: 98 F | SYSTOLIC BLOOD PRESSURE: 169 MMHG

## 2021-05-20 DIAGNOSIS — N94.10 UNSPECIFIED DYSPAREUNIA: ICD-10-CM

## 2021-05-20 PROCEDURE — 99072 ADDL SUPL MATRL&STAF TM PHE: CPT

## 2021-05-20 PROCEDURE — 99214 OFFICE O/P EST MOD 30 MIN: CPT

## 2021-05-20 NOTE — PHYSICAL EXAM
[FreeTextEntry1] : Vaginal incision well healed, sutures have dissolved, no bleeding, appropriately tender to palpation

## 2021-05-20 NOTE — HISTORY OF PRESENT ILLNESS
[FreeTextEntry1] : 40 year old F w hx of dyspareunia and palpable and tender periurethral cyst s/p 5/4/21 EUA Cysto and vaginal exploration with no identifiable cyst.  She is doing better since surgery, pain is improved. No drainage nor bleeding.\par \par Urinating without dysuria nor hematuria.\par \par No constipation.

## 2021-05-20 NOTE — ASSESSMENT
[FreeTextEntry1] : As per patient will wait and see how she is doing at 6 weeks postop before considering resumption of sexual intercourse\par \par \par

## 2021-05-25 RX ORDER — SULFAMETHOXAZOLE AND TRIMETHOPRIM 800; 160 MG/1; MG/1
800-160 TABLET ORAL TWICE DAILY
Qty: 6 | Refills: 0 | Status: COMPLETED | COMMUNITY
Start: 2021-04-30 | End: 2021-05-25

## 2021-06-17 ENCOUNTER — APPOINTMENT (OUTPATIENT)
Dept: UROLOGY | Facility: CLINIC | Age: 41
End: 2021-06-17

## 2021-06-24 ENCOUNTER — APPOINTMENT (OUTPATIENT)
Dept: GASTROENTEROLOGY | Facility: CLINIC | Age: 41
End: 2021-06-24

## 2021-06-28 ENCOUNTER — EMERGENCY (EMERGENCY)
Facility: HOSPITAL | Age: 41
LOS: 1 days | Discharge: HOME | End: 2021-06-28
Attending: HOSPITALIST | Admitting: HOSPITALIST
Payer: COMMERCIAL

## 2021-06-28 VITALS
HEIGHT: 64 IN | OXYGEN SATURATION: 99 % | SYSTOLIC BLOOD PRESSURE: 153 MMHG | DIASTOLIC BLOOD PRESSURE: 88 MMHG | HEART RATE: 69 BPM | RESPIRATION RATE: 17 BRPM | TEMPERATURE: 99 F

## 2021-06-28 DIAGNOSIS — Z98.51 TUBAL LIGATION STATUS: Chronic | ICD-10-CM

## 2021-06-28 DIAGNOSIS — K08.409 PARTIAL LOSS OF TEETH, UNSPECIFIED CAUSE, UNSPECIFIED CLASS: Chronic | ICD-10-CM

## 2021-06-28 DIAGNOSIS — Z98.890 OTHER SPECIFIED POSTPROCEDURAL STATES: Chronic | ICD-10-CM

## 2021-06-28 PROCEDURE — 73562 X-RAY EXAM OF KNEE 3: CPT | Mod: 26,LT

## 2021-06-28 PROCEDURE — 99284 EMERGENCY DEPT VISIT MOD MDM: CPT

## 2021-06-28 RX ORDER — ACETAMINOPHEN 500 MG
975 TABLET ORAL ONCE
Refills: 0 | Status: COMPLETED | OUTPATIENT
Start: 2021-06-28 | End: 2021-06-28

## 2021-06-28 RX ADMIN — Medication 975 MILLIGRAM(S): at 23:21

## 2021-06-28 NOTE — ED PROVIDER NOTE - OBJECTIVE STATEMENT
41 y/o F w/ PMH DVT presents to the ED w/ L knee pain for the past few days. Pt states she has a small bump to her anterior shin below the knee and also endorses anterior and posterior knee pain. Pt reports taking Ibuprofen w/ mild relief and the pain is worse w/ extension. Pt states she went to see her PMD at telehealth who advised her to not take so much Ibuprofen and prescribed her alprazolam.

## 2021-06-28 NOTE — ED PROVIDER NOTE - LOWER EXTREMITY EXAM, LEFT
Small healing bruise to upper shin. L leg tender to popliteal area. Able to flex at knee but pain w/ extension.

## 2021-06-28 NOTE — ED PROVIDER NOTE - PMH
COVID-19  flu like symptoms, never hospitalized  DVT, lower extremity  August 2018 left - treated with Xarelto x 4 months  Murmur, heart  Functional  Obesity    Vaginal bleeding

## 2021-06-28 NOTE — ED ADULT TRIAGE NOTE - CHIEF COMPLAINT QUOTE
pt arrives w/ c/o left knee pain. pt states she banged it on something not sure what it is. pt states hx of DVTs.

## 2021-06-28 NOTE — ED ADULT NURSE NOTE - CHIEF COMPLAINT
Xerosis Aggressive Treatment: I recommended application of Cetaphil or CeraVe numerous times a day and before going to bed to all dry areas. I also prescribed a topical steroid for twice daily use. The patient is a 40y Female complaining of

## 2021-06-28 NOTE — ED PROVIDER NOTE - NS_ ATTENDINGSCRIBEDETAILS _ED_A_ED_FT
Otilia Nova MD: The history, relevant review of systems, past medical and surgical history, medical decision making, and physical examination was documented by the scribe in my presence and I attest to the accuracy of the documentation.

## 2021-06-28 NOTE — ED PROVIDER NOTE - PSH
H/O reduction mammoplasty  2011  H/O tubal ligation  2019  History of  X 2  2000  repair of right pinky-tendon  2006  ? flexor tendon  S/P arthroscopy of shoulder  left 2016 and 2016  S/P tooth extraction    Status post D&C  2019

## 2021-06-28 NOTE — ED PROVIDER NOTE - PATIENT PORTAL LINK FT
You can access the FollowMyHealth Patient Portal offered by [facility] by registering at the following website: [email or web address]. By joining [employer]’s FollowMyHealth portal, you will also be able to view your health information using other applications (apps) compatible with our system.

## 2021-06-28 NOTE — ED PROVIDER NOTE - CLINICAL SUMMARY MEDICAL DECISION MAKING FREE TEXT BOX
39 y/o F w/ PMH DVT p/w L knee pain. Will obtain X-ray, ultrasound L lower extremity, and provide Tylenol for pain.

## 2021-06-28 NOTE — ED PROVIDER NOTE - IV ALTEPLASE EXCL ABS HIDDEN
This note will not be viewable in [address] Date/Time of Call: 05/20/19 1229pm  
What was the patient hospitalized for? S/P TKR, Right Consent for DRAGAN HERRERA Call Does the patient understand his/her diagnosis and/or treatment and what happened during the hospitalization? Called and spoke with patient; she agrees to call. States that she is doing fair, still having some pain and swelling Yes Did the patient receive discharge instructions? Yes  
CM Assessed Risk for Readmission:  
 
 
Patient stated Risk for Readmission:  Patient is a low risk for readmission; scheduled admit No concerns voiced at this time Review any discharge instructions (see discharge instructions/AVS in [facility]). Ask patient if they understand these. Do they have any questions? reviewed DC instructions Were home services ordered (nursing, PT, OT, ST, etc.)? Yes If so, has the first visit occurred? If not, why? (Assist with coordination of services if necessary.) Has not heard from [facility] at this time Was any DME ordered? No   
If so, has it been received? If not, why?  (Assist patient in obtaining DME orders &/or equipment if necessary. ) NA Complete a review of all medications (new, continued and discontinued meds per the D/C instructions and medication tab in [address] [facility]). Reviewed medications START taking: 
aspirin delayed-release 81 mg tablet HYDROmorphone 2 mg tablet (DILAUDID) CHANGE how you take: 
amLODIPine 10 mg tablet (NORVASC) STOP taking: ADVIL 200 mg tablet 
butalbital-acetaminophen-caffeine -40 mg per tablet (FIORICET, ESGIC) oxyCODONE IR 15 mg immediate release tablet (OXY-IR) Were all new prescriptions filled? If not, why?  (Assist patient in obtaining medications if necessary  escalate for CCM &/or SW if ongoing issues are verbalized by pt or anticipated) Yes Does the patient understand the purpose and dosing instructions for all medications? (If patient has questions, provide explanation and education.) Patient verbalizes understanding of medications Does the patient have any problems in performing ADLs? (If patient is unable to perform ADLs  what is the limiting factor(s)? Do they have a support system that can assist? If no support system is present, discuss possible assistance that they may be able to obtain. Escalate for CCM/SW if ongoing issues are verbalized by pt or anticipated) Patient independent with ADLs prior to admission Does the patient have all follow-up appointments scheduled? 7 day f/up with PCP?  
(f/up with PCP may be w/in 14 days if patient has a f/up with their specialist w/in 7 days) 7-14 day f/up with specialist?  
(or per discharge instructions) If f/up has not been made  what actions has the care coordinator made to accomplish this? Has transportation been arranged? yes  
 
 
patient will call and schedule FU with PCP Ortho 06/06/19 Reviewed appointment information with patient Independent or spouse; no concerns Any other questions or concerns expressed by the patient? No questions or concerns are voiced at this time. Care Coordinator contact information provided should any needs arise. Schedule next appointment with DRAGAN Perry or refer to RN Case Manager/ per the workflow guidelines. When is care coordinators next follow-up call scheduled? If referred for CCM  what RN care manager was the referral assigned? Within 30 days Within 30 days NA  
[place]IR[place]IE Call Completed By: Gilda García CMA Care Coordinator show

## 2021-06-28 NOTE — ED ADULT NURSE NOTE - OBJECTIVE STATEMENT
received pt in intake 10B. pt A&OX3, ambulatory. pt with hx of DVT. pt c/o left knee pain x 1 day. pt states she banged it on something, went to urgent care and was sent to the ED for r/o DVT. left knee swollen. no redness, warmth noted. respirations are equal and nonlabored, no respiratory distress noted. pt denies any chest pain, sob, palpitations, cough, fever/chills, headache, dizziness, n/v. pt stable, medicated as per orders. awaiting further plan. will reassess and monitor.

## 2021-06-29 PROCEDURE — 93971 EXTREMITY STUDY: CPT | Mod: 26,LT

## 2021-12-16 ENCOUNTER — APPOINTMENT (OUTPATIENT)
Dept: OBGYN | Facility: CLINIC | Age: 41
End: 2021-12-16
Payer: COMMERCIAL

## 2021-12-16 VITALS
SYSTOLIC BLOOD PRESSURE: 162 MMHG | HEART RATE: 71 BPM | WEIGHT: 200 LBS | TEMPERATURE: 97.2 F | DIASTOLIC BLOOD PRESSURE: 103 MMHG | BODY MASS INDEX: 34.15 KG/M2 | HEIGHT: 64 IN

## 2021-12-16 VITALS — HEART RATE: 73 BPM | SYSTOLIC BLOOD PRESSURE: 157 MMHG | DIASTOLIC BLOOD PRESSURE: 91 MMHG

## 2021-12-16 DIAGNOSIS — Z01.419 ENCOUNTER FOR GYNECOLOGICAL EXAMINATION (GENERAL) (ROUTINE) W/OUT ABNORMAL FINDINGS: ICD-10-CM

## 2021-12-16 DIAGNOSIS — N92.1 EXCESSIVE AND FREQUENT MENSTRUATION WITH IRREGULAR CYCLE: ICD-10-CM

## 2021-12-16 PROCEDURE — 99396 PREV VISIT EST AGE 40-64: CPT

## 2022-02-26 NOTE — ED ADULT NURSE NOTE - OBJECTIVE STATEMENT
Patient states heavy vaginal bleeding with her period, changing her pad every hour today, bleeding with clots. States feeling weak and  a headache. None

## 2022-03-28 LAB — CORE LAB BIOPSY: NORMAL

## 2022-04-19 ENCOUNTER — APPOINTMENT (OUTPATIENT)
Dept: OTOLARYNGOLOGY | Facility: CLINIC | Age: 42
End: 2022-04-19
Payer: COMMERCIAL

## 2022-04-19 VITALS
HEIGHT: 64 IN | SYSTOLIC BLOOD PRESSURE: 145 MMHG | WEIGHT: 196 LBS | DIASTOLIC BLOOD PRESSURE: 94 MMHG | HEART RATE: 76 BPM | BODY MASS INDEX: 33.46 KG/M2

## 2022-04-19 DIAGNOSIS — R43.2 PARAGEUSIA: ICD-10-CM

## 2022-04-19 DIAGNOSIS — J34.89 OTHER SPECIFIED DISORDERS OF NOSE AND NASAL SINUSES: ICD-10-CM

## 2022-04-19 DIAGNOSIS — J31.0 CHRONIC RHINITIS: ICD-10-CM

## 2022-04-19 DIAGNOSIS — R06.83 SNORING: ICD-10-CM

## 2022-04-19 DIAGNOSIS — R09.81 NASAL CONGESTION: ICD-10-CM

## 2022-04-19 DIAGNOSIS — R43.0 ANOSMIA: ICD-10-CM

## 2022-04-19 DIAGNOSIS — H69.83 OTHER SPECIFIED DISORDERS OF EUSTACHIAN TUBE, BILATERAL: ICD-10-CM

## 2022-04-19 DIAGNOSIS — R09.82 POSTNASAL DRIP: ICD-10-CM

## 2022-04-19 PROCEDURE — 92557 COMPREHENSIVE HEARING TEST: CPT

## 2022-04-19 PROCEDURE — 92511 NASOPHARYNGOSCOPY: CPT

## 2022-04-19 PROCEDURE — 92567 TYMPANOMETRY: CPT

## 2022-04-19 PROCEDURE — 99204 OFFICE O/P NEW MOD 45 MIN: CPT | Mod: 25

## 2022-04-19 NOTE — HISTORY OF PRESENT ILLNESS
[de-identified] : 41 year old female here for nasal congestion, sinus pain, sinus pressure, post nasal drip, nasal discharge, anosmia and dysgeusia.  Denies sinus infections in the past year.   had Covid in January 2021.   lost her sense of taste and smell for 8 months and it has partially returned.   right ear feels clogged and has been since January 2021 with Covid as well.   feels hearing comes and goes.  Sometimes has otalgia when she has a lot of ear pressure, feels like when she goes under water.  Denies otorrhea, ear infections.   \par \par She has tried Benadryl without improvement and Flonase causes throat discomfort.

## 2022-04-19 NOTE — PHYSICAL EXAM
[Nasal Endoscopy Performed] : nasal endoscopy was performed, see procedure section for findings [Midline] : trachea located in midline position [Normal] : no rashes [de-identified] : Pale with severe edema

## 2022-04-19 NOTE — CONSULT LETTER
[Dear  ___] : Dear  [unfilled], [Courtesy Letter:] : I had the pleasure of seeing your patient, [unfilled], in my office today. [Please see my note below.] : Please see my note below. [Sincerely,] : Sincerely, [FreeTextEntry2] : Pedro Pablo Seals MD [FreeTextEntry3] : Prabhjot Zavala MD, FACS\par Chief of Otolaryngology at [facility]\par \par Dept. of Otolaryngology\par [facility]\par  [DrSin  ___] : Dr. VINCENT

## 2022-04-19 NOTE — PROCEDURE
[Image(s) Captured] : image(s) captured and filed [Video Captured] : video captured and filed [Topical Lidocaine] : topical lidocaine [Oxymetazoline HCl] : oxymetazoline HCl [Flexible Endoscope] : examined with the flexible endoscope [Serial Number: ___] : Serial Number: [unfilled] [FreeTextEntry6] : NASOPHARYNGOSCOPY\par \par Scope #:\par \par Anesthesia: Topical 4% Lidocaine and Afrin\par \par Procedure:  After informed verbal consent was obtained, the fiberoptic nasal endoscope was passed via the both nasal cavities.\par \par Findings: The choanae are patent.  There is 5% obstruction of the nasopharynx with adenoid tissue.  The OMU is clear bilaterally, without polyps.  There is no evidence of purulent D/C.  \par

## 2022-04-19 NOTE — REASON FOR VISIT
[Initial Evaluation] : an initial evaluation for [FreeTextEntry2] : here for nasal congestion, sinus pain, sinus pressure, post nasal drip, nasal discharge, previous anosmia, dysgeusia

## 2022-04-27 ENCOUNTER — APPOINTMENT (OUTPATIENT)
Dept: ALLERGY | Facility: CLINIC | Age: 42
End: 2022-04-27
Payer: COMMERCIAL

## 2022-04-27 VITALS
SYSTOLIC BLOOD PRESSURE: 130 MMHG | TEMPERATURE: 98.2 F | DIASTOLIC BLOOD PRESSURE: 82 MMHG | HEART RATE: 73 BPM | OXYGEN SATURATION: 98 %

## 2022-04-27 DIAGNOSIS — Z91.018 ALLERGY TO OTHER FOODS: ICD-10-CM

## 2022-04-27 PROCEDURE — 95004 PERQ TESTS W/ALRGNC XTRCS: CPT

## 2022-04-27 PROCEDURE — 99204 OFFICE O/P NEW MOD 45 MIN: CPT | Mod: 25

## 2022-04-27 PROCEDURE — 95018 ALL TSTG PERQ&IQ DRUGS/BIOL: CPT

## 2022-04-27 NOTE — IMPRESSION
[Allergy Testing Dust Mite] : dust mites [Allergy Testing Trees] : trees [Allergy Testing Weeds] : weeds [Allergy Testing Cockroach] : cockroach [Allergy Testing Dog] : dog [Allergy Testing Cat] : cat [] : molds [Allergy Testing Grasses] : grasses

## 2022-05-17 ENCOUNTER — LABORATORY RESULT (OUTPATIENT)
Age: 42
End: 2022-05-17

## 2022-05-17 ENCOUNTER — APPOINTMENT (OUTPATIENT)
Dept: ALLERGY | Facility: CLINIC | Age: 42
End: 2022-05-17

## 2022-05-20 LAB
CASEIN IGE QN: <0.1 KUA/L
DEPRECATED CASEIN IGE RAST QL: 0
DEPRECATED PEANUT IGE RAST QL: 2
DEPRECATED PISTACHIO IGE RAST QL: 1.53 KUA/L
DEPRECATED WALNUT IGE RAST QL: 2
E ANA O3 STORAGE PROTEIN CASHEW (F443) CLASS: 0
E ANA O3 STORAGE PROTEIN CASHEW (F443) CONC: <0.1 KUA/L
PEANUT (RARA H) 1 IGE QN: <0.1 KUA/L
PEANUT (RARA H) 2 IGE QN: <0.1 KUA/L
PEANUT (RARA H) 3 IGE QN: <0.1 KUA/L
PEANUT (RARA H) 6 IGE QN: <0.1 KUA/L
PEANUT (RARA H) 8 IGE QN: 1.08 KUA/L
PEANUT (RARA H) 9 IGE QN: 1.04 KUA/L
PEANUT IGE QN: 0.91 KUA/L
PISTACHIO IGE QN: 2
R JUG R1 STORAGE PROTEIN WALNUT (F441) CLASS: 0
R JUG R1 STORAGE PROTEIN WALNUT (F441) CONC: <0.1 KUA/L
R JUG R3 LPT WALNUT (F442) CLASS: 2
R JUG R3 LPT WALNUT (F442) CONC: 2.13 KUA/L
RARA H 6 STORAGE PROTEIN (F447) CLASS: 0
RARA H1 STORAGE PROTEIN (F422) CLASS: 0
RARA H2 STORAGE PROTEIN (F423) CLASS: 0
RARA H3 STORAGE PROTEIN (F424) CLASS: 0
RARA H8 PR-10 PROTEIN (F352) CLASS: 2
RARA H9 LIPID TRANSFERTP (F427) CLASS: 2
WALNUT IGE QN: 1.46 KUA/L

## 2022-06-21 NOTE — CONSULT LETTER
[Dear  ___] : Dear  [unfilled], [Thank you for referring [unfilled] for consultation for _____] : Thank you for referring [unfilled] for consultation for [unfilled] [Please see my note below.] : Please see my note below. [Sincerely,] : Sincerely, [FreeTextEntry3] : Mitchell B. Boxer, M.D., FAAAAI\par [facility]\par \par Department of Allergy-Immunology\par [facility] \par [address] [place] [address]\par [place] [address]\par Tel:   [phone]\par Fax:  [phone]\par Email: [email or web address]\par

## 2022-06-21 NOTE — REASON FOR VISIT
[Initial Evaluation] : an initial evaluation of [FreeTextEntry3] : food and environmental allergies

## 2022-06-21 NOTE — ASSESSMENT
[FreeTextEntry1] : Perennial allergic rhinitis:\par \par Azelastine 2 puffs each nostril BID\par RV environmental ID testing off nasal spray x 5 days\par \par No evidence of shellfish allergy\par \par TN allergy:\par \par ImmunoCAP to PN , walnut, cashew and pistachio\par Patient instructed on the proper use of an EpiPen - precautions - follow up after use of EpiPen - need to have device available at all times\par Patient instructed to have Benadryl, in addition to EpiPen, available at all times - reviewed indications for use of Benadryl - dosing - precautions and follow up.\par \par \par Addendum:  Patient did not return to complete allergy skin testing.

## 2022-06-21 NOTE — SOCIAL HISTORY
[Spouse/Partner] : spouse/partner [Apartment] : [unfilled] lives in an apartment  [Radiator/Baseboard] : heating provided by radiator(s)/baseboard(s) [Window Units] : air conditioning provided by window units [None] : none [] :  [de-identified] : 2 children  [FreeTextEntry2] : [employer]  [Bedroom] : not in the bedroom [Living Area] : not in the living area [Smokers in Household] : there are no smokers in the home

## 2022-06-21 NOTE — PHYSICAL EXAM
[Alert] : alert [Well Nourished] : well nourished [Healthy Appearance] : healthy appearance [No Acute Distress] : no acute distress [Well Developed] : well developed [Normal Voice/Communication] : normal voice communication [Boggy Nasal Turbinates] : boggy and/or pale nasal turbinates [No Neck Mass] : no neck mass was observed [No LAD] : no lymphadenopathy [No Thyroid Mass] : no thyroid mass [Supple] : the neck was supple [Normal Rate and Effort] : normal respiratory rhythm and effort [No Crackles] : no crackles [No Retractions] : no retractions [Bilateral Audible Breath Sounds] : bilateral audible breath sounds [Normal Rate] : heart rate was normal  [Normal S1, S2] : normal S1 and S2 [No murmur] : no murmur [Regular Rhythm] : with a regular rhythm [Normal Cervical Lymph Nodes] : cervical [Skin Intact] : skin intact  [No Rash] : no rash [No clubbing] : no clubbing [No Edema] : no edema [No Cyanosis] : no cyanosis [Normal Mood] : mood was normal [Normal Affect] : affect was normal [Judgment and Insight Age Appropriate] : judgement and insight is age appropriate [Alert, Awake, Oriented as Age-Appropriate] : alert, awake, oriented as age appropriate [Wheezing] : no wheezing was heard

## 2022-06-21 NOTE — HISTORY OF PRESENT ILLNESS
[Asthma] : asthma [Eczematous rashes] : eczematous rashes [Venom Reactions] : venom reactions [de-identified] : Patient had COVID 1/21 - loss taste/smell, HA and facial swelling - symptoms of HA lasted 21 days - nasal congestion comes and goes since that time.   She has been rinsing with saline solution.   ENT - felt allergy etiology. \par \par History of PAR - sneezing - itchy eyes, nose, ears and throat.   No asthma history but she has sporadic coughing \par \par Shellfish - shrimp - age 9 - swelling of face and eyes - she has reintroduced with no problems - \par Peanut - age 18 - felt throat tightness - ER - avoided peanut - avoids all TN \par \par Nasal steroids have caused sore throat

## 2022-07-01 ENCOUNTER — APPOINTMENT (OUTPATIENT)
Dept: ORTHOPEDIC SURGERY | Facility: CLINIC | Age: 42
End: 2022-07-01

## 2022-07-20 NOTE — ED PROVIDER NOTE - WR ORDER ID 1
"Subjective:   Patient: Marie Irwin [id number], :1963   Visit date:2022 12:36 PM    Chief Complaint:  No chief complaint on file.    HPI:    Prior notes reviewed by myself.  Clinical documentation obtained by nursing staff reviewed.       HPI:     22 - rtc for 4 wk f/u. Review Rast results. At last OV trial with singulair, xyzal, astelin, and albuterol (prn).   Persistent cough - both dry and productive  Using albuterol in afternoon, not daily  Cough worse at night  No recent steroids or hospital visits   No other cold ssx  Denied allergy ssx      22 - 58 yo F presented to clinic for an evaluation of allergy ssx x 5 months. Symptoms include constant PND (clear), rhinorrhea (clear), watery/itchy eyes, sneezing, and coughing. Intermittent wheezing, coughing "fits".   Taking Singulair, zyrtec, and Claritin. Took benadryl two nights ago. Little/no relief with Singulair or zyrtec.  No hx of asthma or eczema.   No nasal sprays.   No food allergies.   No other cold ssx.     Hx of GERD, only with red sauce. Does not take medication for reflux, diet management, well controlled.   Has never been tested for allergies.   -->  Xyzal QPM and Astelin BID, continue Singulair QAM. ; Albuterol prn        Environmental History:  Environmental Hx: Pets in the home: dogs (1). Never smoked.       Past Medical History:   Diagnosis Date    Low iron         Family History   Problem Relation Age of Onset    Diabetes Daughter        Social History     Socioeconomic History    Marital status:    Occupational History     Employer: [employer]   Tobacco Use    Smoking status: Never Smoker    Smokeless tobacco: Never Used   Substance and Sexual Activity    Alcohol use: No    Drug use: No    Sexual activity: Yes     Partners: Male     Birth control/protection: None       Review of patient's allergies indicates:  No Known Allergies        Medication List with Changes/Refills   New Medications    FLUTICASONE " PROPION-SALMETEROL 45-21 MCG/DOSE (ADVAIR HFA) 45-21 MCG/ACTUATION HFAA INHALER    Inhale 2 puffs into the lungs every 12 (twelve) hours. Controller   Current Medications    ALBUTEROL (VENTOLIN HFA) 90 MCG/ACTUATION INHALER    Inhale 2 puffs into the lungs every 4 (four) hours as needed for Wheezing. Rescue    AZELASTINE (ASTELIN) 137 MCG (0.1 %) NASAL SPRAY    1 spray (137 mcg total) by Nasal route 2 (two) times daily.    COVID-19 VAC, LOUIE,PFIZER,,PF, (PFIZER COVID-19 LOUIE VACCN,PF,) 30 MCG/0.3 ML INJECTION    Inject into the muscle.    LEVOCETIRIZINE (XYZAL) 5 MG TABLET    Take 1 tablet (5 mg total) by mouth every evening.    MONTELUKAST (SINGULAIR) 10 MG TABLET    Take 1 tablet (10 mg total) by mouth once daily.         Objective:     Physical Exam:  Vitals:  /76 (BP Location: Left arm, Patient Position: Sitting)   Pulse 97   Temp 97.9 °F (36.6 °C)   Wt 110.4 kg (243 lb 6.2 oz)   BMI 40.50 kg/m²      Physical Exam:  Vitals:  /76 (BP Location: Left arm, Patient Position: Sitting)   Pulse 97   Temp 97.9 °F (36.6 °C)   Wt 110.4 kg (243 lb 6.2 oz)   BMI 40.50 kg/m²    Constitutional:       General: No acute distress.     Appearance: Normal appearance. Well-developed. Not ill-appearing, toxic-appearing or diaphoretic.   HENT:      Head: Normocephalic and atraumatic.      Right Ear: Tympanic membrane, ear canal and external ear normal. There is no impacted cerumen.      Left Ear: Tympanic membrane, ear canal and external ear normal. There is no impacted cerumen.      Nose: No mass or lesion. Clear mucus. BIT's WNL.     Mouth/Throat: No oropharyngeal exudate or posterior oropharyngeal erythema.   Eyes:      General: No scleral icterus.        Right eye: No discharge.         Left eye: No discharge.      Pupils: Pupils are equal, round, and reactive to light.   Neck:      Thyroid: No thyromegaly.   Cardiovascular:      Rate and Rhythm: Normal rate and regular rhythm.      Heart sounds: Normal heart  sounds. No murmur heard.    No friction rub. No gallop.   Pulmonary:      Effort: Pulmonary effort is normal. No respiratory distress.      Breath sounds: Normal breath sounds. No stridor. No rhonchi, or rales. No crackles or wheezing.    Musculoskeletal:         General: No swelling, tenderness, deformity or signs of injury. Normal range of motion.      Right lower leg: No edema.      Left lower leg: No edema.   Lymphadenopathy:      Cervical: No cervical adenopathy.   Skin:     General: Skin is warm.      Coloration: Skin is not jaundiced or pale.      Findings: No bruising, erythema, or rash.   Neurological:      Mental Status: She is alert and oriented to person, place, and time.      Gait: Gait normal.   Psychiatric:         Mood and Affect: Mood is normal.         Behavior: Behavior is normal.          RAST negative.               Assessment & Plan:   Non-allergic rhinitis    Mild persistent reactive airway disease without complication  -     fluticasone propion-salmeterol 45-21 mcg/dose (ADVAIR HFA) 45-21 mcg/actuation HFAA inhaler; Inhale 2 puffs into the lungs every 12 (twelve) hours. Controller  Dispense: 12 g; Refill: 4    Other orders  -     albuterol nebulizer solution 2.5 mg    Arnaud today with obstruction, some improvement with bronchodilator -  trial with Advair QD, albuterol prn.   Recheck in 2-3 mo with Dr. Herberth Desir PALUZ  [facility]  [facility]  [address]  [place] [address]  P: [phone]  F: [phone]     [id number]

## 2022-07-27 ENCOUNTER — RX RENEWAL (OUTPATIENT)
Age: 42
End: 2022-07-27

## 2022-07-27 RX ORDER — AZELASTINE HYDROCHLORIDE 137 UG/1
0.1 SPRAY, METERED NASAL TWICE DAILY
Qty: 1 | Refills: 2 | Status: ACTIVE | COMMUNITY
Start: 2022-04-28 | End: 1900-01-01

## 2022-09-15 ENCOUNTER — APPOINTMENT (OUTPATIENT)
Dept: ORTHOPEDIC SURGERY | Facility: CLINIC | Age: 42
End: 2022-09-15

## 2023-01-18 NOTE — ASU DISCHARGE PLAN (ADULT/PEDIATRIC) - PAIN MANAGEMENT
Detail Level: Detailed Add 91536 Cpt? (Important Note: In 2017 The Use Of 85340 Is Being Tracked By Cms To Determine Future Global Period Reimbursement For Global Periods): no Prescription called to pharmacy/Take over the counter pain medication

## 2023-01-30 ENCOUNTER — APPOINTMENT (OUTPATIENT)
Dept: ORTHOPEDIC SURGERY | Facility: CLINIC | Age: 43
End: 2023-01-30

## 2023-01-30 DIAGNOSIS — M79.641 PAIN IN RIGHT HAND: ICD-10-CM

## 2023-02-01 ENCOUNTER — APPOINTMENT (OUTPATIENT)
Dept: OBGYN | Facility: CLINIC | Age: 43
End: 2023-02-01

## 2023-07-31 ENCOUNTER — APPOINTMENT (OUTPATIENT)
Dept: OBGYN | Facility: CLINIC | Age: 43
End: 2023-07-31

## 2023-11-28 ENCOUNTER — APPOINTMENT (OUTPATIENT)
Dept: OBGYN | Facility: CLINIC | Age: 43
End: 2023-11-28

## 2024-01-02 NOTE — ED ADULT NURSE NOTE - HOW OFTEN DO YOU HAVE SIX OR MORE DRINKS ON ONE OCCASION?
Spoke with patient and they expressed understanding of imaging results.    He is still waiting for his psa results.   Please review as soon as you can.  He has a biopsy coming up and would like to have that to know if he should proceed with biopsy.    Never

## 2024-02-07 NOTE — ED ADULT NURSE NOTE - PAIN RATING/NUMBER SCALE (0-10): ACTIVITY
Continue same medications/treatment.  Patient educated on proper medication use.  Patient educated on risk factor modification.  Please bring any lab results from other providers/physicians to your next appointment.    Please bring all medicines, vitamins, and herbal supplements with you when you come to the office.    Prescriptions will not be filled unless you are compliant with your follow up appointments or have a follow up appointment scheduled as per instruction of your physician. Refills should be requested at the time of your visit.    START pradaxa 150mg twice daily to replace coumadin  Follow up with Charla in 6 months with device check  Continue remote checks at 3 and 9 months    BETTY DALTON RN, AM SCRIBING FOR, AND IN THE PRESENCE OF DR. BENNIE POWELL MD    
7

## 2024-03-14 ENCOUNTER — APPOINTMENT (OUTPATIENT)
Dept: ORTHOPEDIC SURGERY | Facility: CLINIC | Age: 44
End: 2024-03-14
Payer: COMMERCIAL

## 2024-03-14 VITALS — BODY MASS INDEX: 33.46 KG/M2 | WEIGHT: 196 LBS | HEIGHT: 64 IN

## 2024-03-14 DIAGNOSIS — S13.9XXA SPRAIN OF JOINTS AND LIGAMENTS OF UNSPECIFIED PARTS OF NECK, INITIAL ENCOUNTER: ICD-10-CM

## 2024-03-14 DIAGNOSIS — M54.12 RADICULOPATHY, CERVICAL REGION: ICD-10-CM

## 2024-03-14 DIAGNOSIS — I10 ESSENTIAL (PRIMARY) HYPERTENSION: ICD-10-CM

## 2024-03-14 PROCEDURE — 99214 OFFICE O/P EST MOD 30 MIN: CPT

## 2024-03-14 PROCEDURE — 72040 X-RAY EXAM NECK SPINE 2-3 VW: CPT

## 2024-03-14 RX ORDER — LOSARTAN POTASSIUM 100 MG/1
TABLET, FILM COATED ORAL
Refills: 0 | Status: ACTIVE | COMMUNITY

## 2024-03-14 RX ORDER — TIZANIDINE HYDROCHLORIDE 4 MG/1
4 CAPSULE ORAL
Qty: 40 | Refills: 0 | Status: ACTIVE | COMMUNITY
Start: 2024-03-14 | End: 1900-01-01

## 2024-03-14 RX ORDER — MELOXICAM 15 MG/1
15 TABLET ORAL
Qty: 30 | Refills: 0 | Status: ACTIVE | COMMUNITY
Start: 2024-03-14 | End: 1900-01-01

## 2024-03-14 NOTE — HISTORY OF PRESENT ILLNESS
[Neck] : neck [Gradual] : gradual [7] : 7 [5] : 5 [Dull/Aching] : dull/aching [Constant] : constant [Sleep] : sleep [Rest] : rest [Meds] : meds [Sitting] : sitting [de-identified] : 43 year old female with pain in the neck, symptoms started about 2 weeks ago and has been progressively getting worse. Had issue when banged her head getting out of car a few months ago.  Pain with sleeping, getting a comfortable position. No radicular complaints. Tried motrin, L sided neck pain to scapula and mid back [] : no [FreeTextEntry5] : NO trauma, pain began about 4 weeks ago and has gotten worse.  [FreeTextEntry7] : back of shoulder  [FreeTextEntry9] : Iburprpofen, tylenol  [de-identified] : Turning head

## 2024-03-14 NOTE — DISCUSSION/SUMMARY
[de-identified] : modify activities  try OTC meds ice as needed try topical lidocaine reviewed current medications used by this patient  RE:  ALY TRAN   Acct #- [id number]   Attention:  Nurse Reviewer /Medical Director    Based on my patient's condition, I strongly believe that the MRI C spine is medically.necessary.   The patient has failed oral meds, injections and PT and conservative treatment in combination or by themselves and therefore needs the MRI.   The MRI will dictate further treatment t recommendations. [place] after mri

## 2024-03-14 NOTE — PHYSICAL EXAM
[No bony abnormalities] : No bony abnormalities [Straightening consistent with spasm] : Straightening consistent with spasm [] : positive Spurling

## 2024-03-20 ENCOUNTER — APPOINTMENT (OUTPATIENT)
Dept: OBGYN | Facility: CLINIC | Age: 44
End: 2024-03-20
Payer: COMMERCIAL

## 2024-03-20 ENCOUNTER — NON-APPOINTMENT (OUTPATIENT)
Age: 44
End: 2024-03-20

## 2024-03-20 VITALS
DIASTOLIC BLOOD PRESSURE: 86 MMHG | BODY MASS INDEX: 31.92 KG/M2 | WEIGHT: 187 LBS | HEIGHT: 64 IN | SYSTOLIC BLOOD PRESSURE: 134 MMHG

## 2024-03-20 DIAGNOSIS — Z01.419 ENCOUNTER FOR GYNECOLOGICAL EXAMINATION (GENERAL) (ROUTINE) W/OUT ABNORMAL FINDINGS: ICD-10-CM

## 2024-03-20 PROCEDURE — 99459 PELVIC EXAMINATION: CPT

## 2024-03-20 PROCEDURE — 99396 PREV VISIT EST AGE 40-64: CPT

## 2024-03-20 NOTE — COUNSELING
[Nutrition/ Exercise/ Weight Management] : nutrition, exercise, weight management [Vitamins/Supplements] : vitamins/supplements [Body Image] : body image [Breast Self Exam] : breast self exam

## 2024-03-23 ENCOUNTER — APPOINTMENT (OUTPATIENT)
Dept: MRI IMAGING | Facility: CLINIC | Age: 44
End: 2024-03-23

## 2024-03-28 NOTE — END OF VISIT
[FreeTextEntry4] : I, Theresacecilia Momin, acted as a scribe on behalf of Dr. Hawa Coyle on 03/20/2024 .  All medical entries made by the scribe were at my, Dr. Hawa Coyle, direction and personally dictated by me on 03/20/2024 .. I have reviewed the chart and agree that the record accurately reflects my personal performance of the history, physical exam, assessment and plan. I have also personally directed, reviewed, and agreed with the chart.

## 2024-03-28 NOTE — HISTORY OF PRESENT ILLNESS
[Patient reported PAP Smear was normal] : Patient reported PAP Smear was normal [Y] : Patient is sexually active [Monogamous (Male Partner)] : is monogamous with a male partner [PGHxTotal] : 2 [facility] [FreeTextEntry1] : ALY TRAN is 43 year old P2 LMP 03/10/24 presents for annual gyn visit.  Pt feels well and has no complaints. She has normal menses. She denies intermenstrual bleeding, itching, burning or abnormal discharge. Pt has lost weight since last visit.  Pt reports menses last for 3 days followed by pink spotting.    [PapSmeardate] : 09/20

## 2024-03-28 NOTE — PHYSICAL EXAM
[Chaperone Present] : A chaperone was present in the examining room during all aspects of the physical examination [Appropriately responsive] : appropriately responsive [Alert] : alert [No Acute Distress] : no acute distress [No Lymphadenopathy] : no lymphadenopathy [Soft] : soft [Non-distended] : non-distended [Non-tender] : non-tender [No HSM] : No HSM [No Lesions] : no lesions [No Mass] : no mass [Oriented x3] : oriented x3 [Examination Of The Breasts] : a normal appearance [No Masses] : no breast masses were palpable [Labia Majora] : normal [Labia Minora] : normal [Normal] : normal [Uterine Adnexae] : normal [Enlarged ___ wks] : enlarged [unfilled] ~Uweeks [FreeTextEntry1] : SIRENA Camarillo

## 2024-03-29 ENCOUNTER — APPOINTMENT (OUTPATIENT)
Dept: MAMMOGRAPHY | Facility: CLINIC | Age: 44
End: 2024-03-29

## 2024-04-08 LAB
CYTOLOGY CVX/VAG DOC THIN PREP: NORMAL
HPV HIGH+LOW RISK DNA PNL CVX: NOT DETECTED

## 2024-04-12 ENCOUNTER — APPOINTMENT (OUTPATIENT)
Dept: ORTHOPEDIC SURGERY | Facility: CLINIC | Age: 44
End: 2024-04-12

## 2024-06-03 ENCOUNTER — RESULT REVIEW (OUTPATIENT)
Age: 44
End: 2024-06-03

## 2024-06-03 ENCOUNTER — APPOINTMENT (OUTPATIENT)
Dept: MAMMOGRAPHY | Facility: IMAGING CENTER | Age: 44
End: 2024-06-03
Payer: COMMERCIAL

## 2024-06-03 ENCOUNTER — OUTPATIENT (OUTPATIENT)
Dept: OUTPATIENT SERVICES | Facility: HOSPITAL | Age: 44
LOS: 1 days | End: 2024-06-03
Payer: COMMERCIAL

## 2024-06-03 DIAGNOSIS — Z01.419 ENCOUNTER FOR GYNECOLOGICAL EXAMINATION (GENERAL) (ROUTINE) WITHOUT ABNORMAL FINDINGS: ICD-10-CM

## 2024-06-03 DIAGNOSIS — Z98.890 OTHER SPECIFIED POSTPROCEDURAL STATES: Chronic | ICD-10-CM

## 2024-06-03 DIAGNOSIS — K08.409 PARTIAL LOSS OF TEETH, UNSPECIFIED CAUSE, UNSPECIFIED CLASS: Chronic | ICD-10-CM

## 2024-06-03 DIAGNOSIS — Z98.51 TUBAL LIGATION STATUS: Chronic | ICD-10-CM

## 2024-06-03 PROCEDURE — 77067 SCR MAMMO BI INCL CAD: CPT

## 2024-06-03 PROCEDURE — 77063 BREAST TOMOSYNTHESIS BI: CPT | Mod: 26

## 2024-06-03 PROCEDURE — 77067 SCR MAMMO BI INCL CAD: CPT | Mod: 26

## 2024-06-03 PROCEDURE — 77063 BREAST TOMOSYNTHESIS BI: CPT

## 2024-06-17 NOTE — ED PROVIDER NOTE - NS ED NOTE AC HIGH RISK COUNTRIES
SUBJECTIVE:   CC: Erin Ceron is an 19 year old woman who presents for preventive health visit. She is concerned about vulvar irritation.  It happened the last 2 months after her monthly bleeding. She uses the birth control patch.  She agrees to GC/CT testing today.  She has one partner.  She thinks that she could eat better and thinks that she does not eat much.        Patient has been advised of split billing requirements and indicates understanding: Yes  Healthy Habits:  Do you get at least three servings of calcium containing foods daily (dairy, green leafy vegetables, etc.)? no  Amount of exercise or daily activities, outside of work: 3   day(s) per week  Problems taking medications regularly No  Medication side effects: No  Have you had an eye exam in the past two years? no  Do you see a dentist twice per year? yes  Do you have sleep apnea, excessive snoring or daytime drowsiness?no          Today's PHQ-2 Score:       12/5/2023    11:21 AM   PHQ-2 ( 1999 [employer])   Q1: Little interest or pleasure in doing things 0   Q2: Feeling down, depressed or hopeless 0   PHQ-2 Score 0   Q1: Little interest or pleasure in doing things Not at all   Q2: Feeling down, depressed or hopeless Not at all   PHQ-2 Score 0       Abuse: Current or Past(Physical, Sexual or Emotional)- No  Do you feel safe in your environment? Yes        Social History     Tobacco Use    Smoking status: Never    Smokeless tobacco: Never   Substance Use Topics    Alcohol use: No     If you drink alcohol do you typically have >3 drinks per day or >7 drinks per week? No                     Reviewed orders with patient.  Reviewed health maintenance and updated orders accordingly - Yes  Labs reviewed in EPIC        Pertinent mammograms are reviewed under the imaging tab.  History of abnormal Pap smear: No - under age 21, PAP not appropriate for age     Reviewed and updated as needed this visit by clinical staff   Tobacco  Allergies  Meds   Med Hx   "Surg Hx  Fam Hx  Soc Hx        Reviewed and updated as needed this visit by Provider                  Past Medical History:   Diagnosis Date    RAD (reactive airway disease)     Unspecified otitis media 1.2009    Otitis Media        ROS:  CONSTITUTIONAL: NEGATIVE for fever, chills, change in weight  INTEGUMENTARU/SKIN: NEGATIVE for worrisome rashes, moles or lesions  EYES: NEGATIVE for vision changes or irritation  ENT: NEGATIVE for ear, mouth and throat problems  RESP: NEGATIVE for significant cough or SOB  BREAST: NEGATIVE for masses, tenderness or discharge  CV: NEGATIVE for chest pain, palpitations or peripheral edema  GI: NEGATIVE for nausea, abdominal pain, heartburn, or change in bowel habits  : NEGATIVE for unusual urinary - notes vaginal irritation after bleeding for the last 2 months. Periods are regular with patch .  MUSCULOSKELETAL: NEGATIVE for significant arthralgias or myalgia  NEURO: NEGATIVE for weakness, dizziness or paresthesias  PSYCHIATRIC: NEGATIVE for changes in mood or affect    OBJECTIVE:   /70 (BP Location: Right arm, Patient Position: Chair, Cuff Size: Adult Regular)   Pulse 76   Temp 98.7  F (37.1  C) (Tympanic)   Resp 18   Ht 1.588 m (5' 2.5\")   Wt 83.5 kg (184 lb)   LMP 06/01/2024   BMI 33.12 kg/m    EXAM:  GENERAL: alert and no distress  EYES: Eyes grossly normal to inspection, PERRL and conjunctivae and sclerae normal  HENT: ear canals and TM's normal, nose and mouth without ulcers or lesions  NECK: no adenopathy, no asymmetry, masses, or scars  RESP: lungs clear to auscultation - no rales, rhonchi or wheezes  BREAST: normal without masses, tenderness or nipple discharge and no palpable axillary masses or adenopathy  CV: regular rate and rhythm, normal S1 S2, no S3 or S4, no murmur, click or rub, no peripheral edema  ABDOMEN: soft, nontender, no hepatosplenomegaly, no masses and bowel sounds normal   (female) w/bimanual: normal female external genitalia, normal " "urethral meatus, vaginal mucosa- appears red and irritated, and normal cervix/adnexa/uterus without masses or discharge  Wet prep and GC/CT swabs collected   MS: no gross musculoskeletal defects noted, no edema  SKIN: no suspicious lesions or rashes  NEURO: Normal strength and tone, mentation intact and speech normal  PSYCH: mentation appears normal, affect normal/bright    Labs reviewed in Epic    ASSESSMENT/PLAN:       ICD-10-CM    1. Annual physical exam  Z00.00       2. Class 1 obesity due to excess calories with body mass index (BMI) of 33.0 to 33.9 in adult, unspecified whether serious comorbidity present  E66.09     Z68.33       3. Vaginal irritation  N89.8 Wet prep - Clinic Collect      4. Screening examination for STI  Z11.3       5. Encounter for surveillance of transdermal patch hormonal contraceptive device  Z30.45       6. Routine screening for STI (sexually transmitted infection)  Z11.3 Chlamydia trachomatis/Neisseria gonorrhoeae by PCR          Patient has been advised of split billing requirements and indicates understanding: Yes  COUNSELING:   Reviewed preventive health counseling, as reflected in patient instructions       Regular exercise       Healthy diet/nutrition       Contraception       Safe sex practices/STD prevention    Estimated body mass index is 33.12 kg/m  as calculated from the following:    Height as of this encounter: 1.588 m (5' 2.5\").    Weight as of this encounter: 83.5 kg (184 lb).    Weight management plan: Discussed healthy diet and exercise guidelines, referrals offered and declined at this time.      She reports that she has never smoked. She has never used smokeless tobacco.      Counseling Resources:  ATP IV Guidelines  Pooled Cohorts Equation Calculator  Breast Cancer Risk Calculator  BRCA-Related Cancer Risk Assessment: FHS-7 Tool  FRAX Risk Assessment  ICSI Preventive Guidelines  Dietary Guidelines for Americans, 2010  USDA's MyPlate  ASA Prophylaxis  Lung CA " Screening    Chen eTllez CNM  [facility] [place]     No/Other Countries

## 2024-07-23 ENCOUNTER — NON-APPOINTMENT (OUTPATIENT)
Age: 44
End: 2024-07-23

## 2024-07-23 ENCOUNTER — APPOINTMENT (OUTPATIENT)
Dept: ORTHOPEDIC SURGERY | Facility: CLINIC | Age: 44
End: 2024-07-23
Payer: COMMERCIAL

## 2024-07-23 DIAGNOSIS — G56.21 LESION OF ULNAR NERVE, RIGHT UPPER LIMB: ICD-10-CM

## 2024-07-23 DIAGNOSIS — M75.101 UNSPECIFIED ROTATOR CUFF TEAR OR RUPTURE OF RIGHT SHOULDER, NOT SPECIFIED AS TRAUMATIC: ICD-10-CM

## 2024-07-23 DIAGNOSIS — M25.521 PAIN IN RIGHT ELBOW: ICD-10-CM

## 2024-07-23 PROCEDURE — 73030 X-RAY EXAM OF SHOULDER: CPT | Mod: RT

## 2024-07-23 PROCEDURE — J3490M: CUSTOM

## 2024-07-23 PROCEDURE — 73080 X-RAY EXAM OF ELBOW: CPT | Mod: RT

## 2024-07-23 PROCEDURE — 20610 DRAIN/INJ JOINT/BURSA W/O US: CPT | Mod: RT

## 2024-07-23 PROCEDURE — 99204 OFFICE O/P NEW MOD 45 MIN: CPT | Mod: 25

## 2024-07-23 NOTE — HISTORY OF PRESENT ILLNESS
[de-identified] : 7/23/24: 8 days ago developed stiffness and pain in blanca right ar/elbow- pain radiating up the whole arm- elbow to shoulder  desk work.

## 2024-07-23 NOTE — IMAGING
[de-identified] : right elbow: no swelling/ecchymosis rom is 20- full ttp over medial epicondyle + Tinel's over cubital tunnel  right shoulder: no swelling/ecchymosis abd and FF to 90 + impingement nvid  xrays 3 views right elbow taken today show no fx/dislocations xrays 3 views right shoulder taken today show no fx/dislocations

## 2024-07-23 NOTE — REASON FOR VISIT
[FreeTextEntry2] : New injury - right hand, elbow, shoulder.  numbness in hand, pain in shoulder and elbow, cannot straighten elbow

## 2024-07-27 NOTE — H&P PST ADULT - PROBLEM SELECTOR PROBLEM 3
Dr. Isaacs: I have personally performed a face to face bedside history and physical examination of this patient. I have discussed the history, examination, review of systems, assessment and plan of management with the resident. I have reviewed the electronic medical record and amended it to reflect my history, review of systems, physical exam, assessment and plan.
Shellfish allergy

## 2024-08-05 ENCOUNTER — APPOINTMENT (OUTPATIENT)
Dept: ORTHOPEDIC SURGERY | Facility: CLINIC | Age: 44
End: 2024-08-05

## 2024-08-05 ENCOUNTER — NON-APPOINTMENT (OUTPATIENT)
Age: 44
End: 2024-08-05

## 2024-08-05 PROCEDURE — 99213 OFFICE O/P EST LOW 20 MIN: CPT

## 2024-08-05 NOTE — IMAGING
[de-identified] : right elbow: no swelling/ecchymosis rom is 20- full ttp over medial epicondyle + Tinel's over cubital tunnel  right shoulder: no swelling/ecchymosis abd and FF to 90 + impingement nvid  xrays 3 views right elbow taken today show no fx/dislocations xrays 3 views right shoulder taken today show no fx/dislocations

## 2024-08-05 NOTE — HISTORY OF PRESENT ILLNESS
[de-identified] : 8/5/24: been wearing CuT brace at night- helps at night, but overall feeling the same injection helped in the shoulder, but feels like it wearing off  7/23/24: 8 days ago developed stiffness and pain in blanca right ar/elbow- pain radiating up the whole arm- elbow to shoulder  desk work.

## 2024-08-05 NOTE — ASSESSMENT
[FreeTextEntry1] : for shoulder recommend mri as injection wore off after 2 weeks and patient failed physician directed HEP  for CuTS, recommend EMG  The patient was advised of the diagnosis.  The natural history of the pathology was explained in full to the patient in layman's terms. We discussed the nature of the nerve as an electrical cable and what happens to the nerve in cubitaal tunnel syndrome.  We discussed that treatment for night symptoms included night bracing.  We discussed the use of nerve testing in cases when diagnosis was in doubt or for confirmation to exclude alternate pathology.  We discussed that if symptoms were 24/7 surgery was recommended to give the nerve the best chance to recover but that once symptoms were constant, the nerve may not recover even with surgery.  We discussed that if left alone the nerve progression could worsen and that treatment was indicated to prevent progression of nerve compression.  The longer the nerve is left, the more likely to cause worsening irreversible damage.  All questions were answered.  The risks and benefits of surgical and non-surgical treatment alternatives were explained in full to the patient.   accommodation to work from home to limit driving- relieve shoulder and elbow

## 2024-08-10 ENCOUNTER — APPOINTMENT (OUTPATIENT)
Dept: MRI IMAGING | Facility: CLINIC | Age: 44
End: 2024-08-10

## 2024-08-10 PROCEDURE — 73221 MRI JOINT UPR EXTREM W/O DYE: CPT | Mod: RT

## 2024-08-14 ENCOUNTER — APPOINTMENT (OUTPATIENT)
Dept: NEUROLOGY | Facility: CLINIC | Age: 44
End: 2024-08-14
Payer: COMMERCIAL

## 2024-08-14 PROCEDURE — 95911 NRV CNDJ TEST 9-10 STUDIES: CPT

## 2024-08-14 PROCEDURE — 95886 MUSC TEST DONE W/N TEST COMP: CPT

## 2024-08-20 ENCOUNTER — APPOINTMENT (OUTPATIENT)
Dept: ORTHOPEDIC SURGERY | Facility: CLINIC | Age: 44
End: 2024-08-20
Payer: COMMERCIAL

## 2024-08-20 VITALS — HEIGHT: 64 IN | WEIGHT: 187 LBS | BODY MASS INDEX: 31.92 KG/M2

## 2024-08-20 DIAGNOSIS — M75.42 IMPINGEMENT SYNDROME OF LEFT SHOULDER: ICD-10-CM

## 2024-08-20 DIAGNOSIS — S46.012D STRAIN OF MUSCLE(S) AND TENDON(S) OF THE ROTATOR CUFF OF LEFT SHOULDER, SUBSEQUENT ENCOUNTER: ICD-10-CM

## 2024-08-20 PROCEDURE — 99213 OFFICE O/P EST LOW 20 MIN: CPT

## 2024-08-20 NOTE — DISCUSSION/SUMMARY
[de-identified] : Patient allowed to gently start resuming activities. Discussed change to medication prescription and usage. Bracing options discussed with patient for stability and support. Activity modification as needed Discussed poss future surgery, pt deciding, questions answered, no guarantees try topical lidocaine for pain control reviewed current medications used by this patient Home exercises for functional return letter of med necessity for arthroscopy L shoulder RCR, sad, questions answered, no guarantees

## 2024-08-20 NOTE — PHYSICAL EXAM
[Right] : right shoulder [4___] : external rotation 4[unfilled]/5 [] : motor and sensory intact distally [TWNoteComboBox7] : active forward flexion 130 degrees

## 2024-08-20 NOTE — HISTORY OF PRESENT ILLNESS
[Constant] : constant [Rest] : rest [Meds] : meds [Lying in bed] : lying in bed [de-identified] : she is RHD, 4 or more weeks ago, no injury, tried motrin without help and lido patch also symptoms going down her R arm, had MRI, had csi, had AS L shoulder by Dr Laws in 2018 [] : no [FreeTextEntry1] : RT SHOULDER  [FreeTextEntry5] : Patient has seen  for right shoulder in the past. Pain began in July and has continued.  [FreeTextEntry7] : elbow  [de-identified] : lifting and reaching  [de-identified] : Scarlet  [de-identified] : X-ray and MRI

## 2024-08-29 ENCOUNTER — APPOINTMENT (OUTPATIENT)
Dept: ORTHOPEDIC SURGERY | Facility: CLINIC | Age: 44
End: 2024-08-29

## 2024-09-03 ENCOUNTER — APPOINTMENT (OUTPATIENT)
Age: 44
End: 2024-09-03
Payer: COMMERCIAL

## 2024-09-03 PROCEDURE — 29826 SHO ARTHRS SRG DECOMPRESSION: CPT | Mod: AS,RT

## 2024-09-03 PROCEDURE — 29827 SHO ARTHRS SRG RT8TR CUF RPR: CPT | Mod: RT

## 2024-09-03 PROCEDURE — 29826 SHO ARTHRS SRG DECOMPRESSION: CPT | Mod: RT

## 2024-09-03 PROCEDURE — 29827 SHO ARTHRS SRG RT8TR CUF RPR: CPT | Mod: AS,RT

## 2024-09-03 RX ORDER — ASPIRIN 325 MG/1
325 TABLET, FILM COATED ORAL DAILY
Qty: 14 | Refills: 0 | Status: ACTIVE | COMMUNITY
Start: 2024-09-03 | End: 1900-01-01

## 2024-09-06 ENCOUNTER — APPOINTMENT (OUTPATIENT)
Dept: OBGYN | Facility: CLINIC | Age: 44
End: 2024-09-06

## 2024-09-11 ENCOUNTER — APPOINTMENT (OUTPATIENT)
Dept: ORTHOPEDIC SURGERY | Facility: CLINIC | Age: 44
End: 2024-09-11
Payer: COMMERCIAL

## 2024-09-11 VITALS — BODY MASS INDEX: 31.92 KG/M2 | WEIGHT: 187 LBS | HEIGHT: 64 IN

## 2024-09-11 DIAGNOSIS — M75.101 UNSPECIFIED ROTATOR CUFF TEAR OR RUPTURE OF RIGHT SHOULDER, NOT SPECIFIED AS TRAUMATIC: ICD-10-CM

## 2024-09-11 DIAGNOSIS — S46.011D STRAIN OF MUSCLE(S) AND TENDON(S) OF THE ROTATOR CUFF OF RIGHT SHOULDER, SUBSEQUENT ENCOUNTER: ICD-10-CM

## 2024-09-11 PROCEDURE — 99024 POSTOP FOLLOW-UP VISIT: CPT

## 2024-09-11 RX ORDER — OXYCODONE AND ACETAMINOPHEN 5; 325 MG/1; MG/1
5-325 TABLET ORAL
Qty: 40 | Refills: 0 | Status: ACTIVE | COMMUNITY
Start: 2024-09-03 | End: 1900-01-01

## 2024-09-11 NOTE — PHYSICAL EXAM
[Right] : right shoulder [4___] : external rotation 4[unfilled]/5 [] : positive impingement testing [TWNoteComboBox7] : active forward flexion 130 degrees

## 2024-09-11 NOTE — DISCUSSION/SUMMARY
[de-identified] : Patient allowed to gently start resuming activities. Discussed change to medication prescription and usage. Bracing options discussed with patient for stability and support. Activity modification as needed Discussed poss future surgery, pt deciding, questions answered, no guarantees try topical lidocaine for pain control reviewed current medications used by this patient Home exercises for functional return 09/11/2024    RE:  ALY CHELSEA   [id number]    Attention:  Nurse Reviewer /Medical Director  I am writing this letter as a medical necessity for PT program. Patient has tried analgesics, non-steroid anti-inflammatory agents,  hot or cold compresses,injections of corticosteroids, etc)  which in combination or by themselves has not worked. Based on my patient's condition, I strongly believe that the PT is medically needed.   Thank you for your time and consideration.    begin wk 9/23

## 2024-09-11 NOTE — HISTORY OF PRESENT ILLNESS
[Gradual] : gradual [2] : 2 [Dull/Aching] : dull/aching [Rest] : rest [Meds] : meds [de-identified] : one week after R shoulder RCR and sad, in SI no fevers nor chills [Constant] : constant [Lying in bed] : lying in bed [] : yes [FreeTextEntry1] : RT SHOULDER  [FreeTextEntry5] : Patient has seen  for right shoulder in the past. Pain began in July and has continued.  [FreeTextEntry7] : elbow  [de-identified] : lifting and reaching  [de-identified] : Scarlet  [de-identified] : X-ray and MRI [de-identified] : 9/2/24

## 2024-10-23 ENCOUNTER — APPOINTMENT (OUTPATIENT)
Dept: ORTHOPEDIC SURGERY | Facility: CLINIC | Age: 44
End: 2024-10-23
Payer: COMMERCIAL

## 2024-10-23 VITALS — HEIGHT: 64 IN | BODY MASS INDEX: 31.92 KG/M2 | WEIGHT: 187 LBS

## 2024-10-23 DIAGNOSIS — S46.011D STRAIN OF MUSCLE(S) AND TENDON(S) OF THE ROTATOR CUFF OF RIGHT SHOULDER, SUBSEQUENT ENCOUNTER: ICD-10-CM

## 2024-10-23 PROCEDURE — 99024 POSTOP FOLLOW-UP VISIT: CPT

## 2024-12-02 ENCOUNTER — APPOINTMENT (OUTPATIENT)
Dept: ORTHOPEDIC SURGERY | Facility: CLINIC | Age: 44
End: 2024-12-02

## 2025-01-13 ENCOUNTER — APPOINTMENT (OUTPATIENT)
Dept: ORTHOPEDIC SURGERY | Facility: CLINIC | Age: 45
End: 2025-01-13

## 2025-03-23 ENCOUNTER — NON-APPOINTMENT (OUTPATIENT)
Age: 45
End: 2025-03-23

## 2025-03-24 ENCOUNTER — APPOINTMENT (OUTPATIENT)
Dept: ORTHOPEDIC SURGERY | Facility: CLINIC | Age: 45
End: 2025-03-24
Payer: COMMERCIAL

## 2025-03-24 DIAGNOSIS — M65.4 RADIAL STYLOID TENOSYNOVITIS [DE QUERVAIN]: ICD-10-CM

## 2025-03-24 DIAGNOSIS — S63.621A SPRAIN OF INTERPHALANGEAL JOINT OF RIGHT THUMB, INITIAL ENCOUNTER: ICD-10-CM

## 2025-03-24 PROCEDURE — 29130 APPL FINGER SPLINT STATIC: CPT | Mod: F5

## 2025-03-24 PROCEDURE — 99214 OFFICE O/P EST MOD 30 MIN: CPT | Mod: 25

## 2025-03-24 RX ORDER — DICLOFENAC SODIUM 10 MG/G
1 GEL TOPICAL DAILY
Qty: 1 | Refills: 3 | Status: ACTIVE | COMMUNITY
Start: 2025-03-24 | End: 1900-01-01

## 2025-03-24 RX ORDER — NAPROXEN SODIUM 550 MG/1
550 TABLET ORAL
Qty: 60 | Refills: 1 | Status: ACTIVE | COMMUNITY
Start: 2025-03-24 | End: 1900-01-01

## 2025-03-25 ENCOUNTER — APPOINTMENT (OUTPATIENT)
Dept: OBGYN | Facility: CLINIC | Age: 45
End: 2025-03-25

## 2025-04-22 ENCOUNTER — APPOINTMENT (OUTPATIENT)
Dept: ORTHOPEDIC SURGERY | Facility: CLINIC | Age: 45
End: 2025-04-22

## 2025-07-29 ENCOUNTER — APPOINTMENT (OUTPATIENT)
Dept: OBGYN | Facility: CLINIC | Age: 45
End: 2025-07-29
Payer: COMMERCIAL

## 2025-07-29 ENCOUNTER — ASOB RESULT (OUTPATIENT)
Age: 45
End: 2025-07-29

## 2025-07-29 VITALS
DIASTOLIC BLOOD PRESSURE: 84 MMHG | BODY MASS INDEX: 33.12 KG/M2 | WEIGHT: 194 LBS | HEART RATE: 68 BPM | SYSTOLIC BLOOD PRESSURE: 129 MMHG | HEIGHT: 64 IN

## 2025-07-29 DIAGNOSIS — N93.9 ABNORMAL UTERINE AND VAGINAL BLEEDING, UNSPECIFIED: ICD-10-CM

## 2025-07-29 PROCEDURE — 76857 US EXAM PELVIC LIMITED: CPT | Mod: 59

## 2025-07-29 PROCEDURE — 76830 TRANSVAGINAL US NON-OB: CPT

## 2025-07-29 PROCEDURE — 99213 OFFICE O/P EST LOW 20 MIN: CPT

## 2025-07-29 PROCEDURE — 99459 PELVIC EXAMINATION: CPT

## 2025-07-29 RX ORDER — METOPROLOL SUCCINATE 100 MG/1
100 TABLET, EXTENDED RELEASE ORAL
Refills: 0 | Status: ACTIVE | COMMUNITY

## 2025-07-30 ENCOUNTER — NON-APPOINTMENT (OUTPATIENT)
Age: 45
End: 2025-07-30

## 2025-07-30 DIAGNOSIS — R19.00 INTRA-ABDOMINAL AND PELVIC SWELLING, MASS AND LUMP, UNSPECIFIED SITE: ICD-10-CM

## 2025-07-30 LAB
HCG SERPL-MCNC: <1 MIU/ML
HCT VFR BLD CALC: 37.1 %
HGB BLD-MCNC: 11.9 G/DL
MCHC RBC-ENTMCNC: 28.8 PG
MCHC RBC-ENTMCNC: 32.1 G/DL
MCV RBC AUTO: 89.8 FL
PLATELET # BLD AUTO: 167 K/UL
RBC # BLD: 4.13 M/UL
RBC # FLD: 13.8 %
TSH SERPL-ACNC: 3.3 UIU/ML
WBC # FLD AUTO: 5.05 K/UL

## 2025-07-31 LAB
BV BACTERIA RRNA VAG QL NAA+PROBE: NOT DETECTED
C GLABRATA RNA VAG QL NAA+PROBE: NOT DETECTED
C TRACH RRNA SPEC QL NAA+PROBE: NOT DETECTED
CANDIDA RRNA VAG QL PROBE: NOT DETECTED
N GONORRHOEA RRNA SPEC QL NAA+PROBE: NOT DETECTED
T VAGINALIS RRNA SPEC QL NAA+PROBE: NOT DETECTED

## 2025-07-31 RX ORDER — NORETHINDRONE ACETATE 5 MG/1
5 TABLET ORAL
Qty: 60 | Refills: 1 | Status: ACTIVE | COMMUNITY
Start: 2025-07-31 | End: 1900-01-01

## 2025-08-04 ENCOUNTER — OUTPATIENT (OUTPATIENT)
Dept: OUTPATIENT SERVICES | Facility: HOSPITAL | Age: 45
LOS: 1 days | End: 2025-08-04
Payer: COMMERCIAL

## 2025-08-04 ENCOUNTER — APPOINTMENT (OUTPATIENT)
Dept: MRI IMAGING | Facility: CLINIC | Age: 45
End: 2025-08-04
Payer: COMMERCIAL

## 2025-08-04 DIAGNOSIS — R19.00 INTRA-ABDOMINAL AND PELVIC SWELLING, MASS AND LUMP, UNSPECIFIED SITE: ICD-10-CM

## 2025-08-04 DIAGNOSIS — Z98.51 TUBAL LIGATION STATUS: Chronic | ICD-10-CM

## 2025-08-04 DIAGNOSIS — Z98.890 OTHER SPECIFIED POSTPROCEDURAL STATES: Chronic | ICD-10-CM

## 2025-08-04 DIAGNOSIS — K08.409 PARTIAL LOSS OF TEETH, UNSPECIFIED CAUSE, UNSPECIFIED CLASS: Chronic | ICD-10-CM

## 2025-08-04 PROCEDURE — A9585: CPT

## 2025-08-04 PROCEDURE — 72197 MRI PELVIS W/O & W/DYE: CPT

## 2025-08-04 PROCEDURE — 72197 MRI PELVIS W/O & W/DYE: CPT | Mod: 26

## 2025-08-06 ENCOUNTER — EMERGENCY (EMERGENCY)
Facility: HOSPITAL | Age: 45
LOS: 1 days | End: 2025-08-06
Attending: EMERGENCY MEDICINE
Payer: COMMERCIAL

## 2025-08-06 VITALS
TEMPERATURE: 99 F | OXYGEN SATURATION: 98 % | DIASTOLIC BLOOD PRESSURE: 74 MMHG | HEART RATE: 74 BPM | SYSTOLIC BLOOD PRESSURE: 125 MMHG | RESPIRATION RATE: 17 BRPM

## 2025-08-06 VITALS
HEART RATE: 71 BPM | OXYGEN SATURATION: 99 % | WEIGHT: 194.01 LBS | DIASTOLIC BLOOD PRESSURE: 84 MMHG | HEIGHT: 64 IN | RESPIRATION RATE: 18 BRPM | SYSTOLIC BLOOD PRESSURE: 127 MMHG | TEMPERATURE: 98 F

## 2025-08-06 DIAGNOSIS — Z98.890 OTHER SPECIFIED POSTPROCEDURAL STATES: Chronic | ICD-10-CM

## 2025-08-06 DIAGNOSIS — Z98.51 TUBAL LIGATION STATUS: Chronic | ICD-10-CM

## 2025-08-06 DIAGNOSIS — K08.409 PARTIAL LOSS OF TEETH, UNSPECIFIED CAUSE, UNSPECIFIED CLASS: Chronic | ICD-10-CM

## 2025-08-06 LAB
ALBUMIN SERPL ELPH-MCNC: 3.8 G/DL — SIGNIFICANT CHANGE UP (ref 3.3–5)
ALP SERPL-CCNC: 43 U/L — SIGNIFICANT CHANGE UP (ref 40–120)
ALT FLD-CCNC: 13 U/L — SIGNIFICANT CHANGE UP (ref 10–45)
ANION GAP SERPL CALC-SCNC: 12 MMOL/L — SIGNIFICANT CHANGE UP (ref 5–17)
APPEARANCE UR: CLEAR — SIGNIFICANT CHANGE UP
AST SERPL-CCNC: 22 U/L — SIGNIFICANT CHANGE UP (ref 10–40)
BACTERIA # UR AUTO: NEGATIVE /HPF — SIGNIFICANT CHANGE UP
BASOPHILS # BLD AUTO: 0.07 K/UL — SIGNIFICANT CHANGE UP (ref 0–0.2)
BASOPHILS NFR BLD AUTO: 0.7 % — SIGNIFICANT CHANGE UP (ref 0–2)
BILIRUB SERPL-MCNC: 0.3 MG/DL — SIGNIFICANT CHANGE UP (ref 0.2–1.2)
BILIRUB UR-MCNC: NEGATIVE — SIGNIFICANT CHANGE UP
BUN SERPL-MCNC: 7 MG/DL — SIGNIFICANT CHANGE UP (ref 7–23)
CALCIUM SERPL-MCNC: 8.8 MG/DL — SIGNIFICANT CHANGE UP (ref 8.4–10.5)
CAST: 0 /LPF — SIGNIFICANT CHANGE UP (ref 0–4)
CHLORIDE SERPL-SCNC: 105 MMOL/L — SIGNIFICANT CHANGE UP (ref 96–108)
CO2 SERPL-SCNC: 20 MMOL/L — LOW (ref 22–31)
COLOR SPEC: YELLOW — SIGNIFICANT CHANGE UP
CREAT SERPL-MCNC: 0.61 MG/DL — SIGNIFICANT CHANGE UP (ref 0.5–1.3)
DIFF PNL FLD: ABNORMAL
EGFR: 113 ML/MIN/1.73M2 — SIGNIFICANT CHANGE UP
EGFR: 113 ML/MIN/1.73M2 — SIGNIFICANT CHANGE UP
EOSINOPHIL # BLD AUTO: 0.13 K/UL — SIGNIFICANT CHANGE UP (ref 0–0.5)
EOSINOPHIL NFR BLD AUTO: 1.4 % — SIGNIFICANT CHANGE UP (ref 0–6)
GAS PNL BLDV: SIGNIFICANT CHANGE UP
GLUCOSE SERPL-MCNC: 98 MG/DL — SIGNIFICANT CHANGE UP (ref 70–99)
GLUCOSE UR QL: NEGATIVE MG/DL — SIGNIFICANT CHANGE UP
HCG SERPL-ACNC: <2 MIU/ML — SIGNIFICANT CHANGE UP
HCT VFR BLD CALC: 30.3 % — LOW (ref 34.5–45)
HGB BLD-MCNC: 10 G/DL — LOW (ref 11.5–15.5)
IMM GRANULOCYTES # BLD AUTO: 0.03 K/UL — SIGNIFICANT CHANGE UP (ref 0–0.07)
IMM GRANULOCYTES NFR BLD AUTO: 0.3 % — SIGNIFICANT CHANGE UP (ref 0–0.9)
KETONES UR QL: NEGATIVE MG/DL — SIGNIFICANT CHANGE UP
LEUKOCYTE ESTERASE UR-ACNC: NEGATIVE — SIGNIFICANT CHANGE UP
LIDOCAIN IGE QN: 22 U/L — SIGNIFICANT CHANGE UP (ref 7–60)
LYMPHOCYTES # BLD AUTO: 1.19 K/UL — SIGNIFICANT CHANGE UP (ref 1–3.3)
LYMPHOCYTES NFR BLD AUTO: 12.5 % — LOW (ref 13–44)
MCHC RBC-ENTMCNC: 29.1 PG — SIGNIFICANT CHANGE UP (ref 27–34)
MCHC RBC-ENTMCNC: 33 G/DL — SIGNIFICANT CHANGE UP (ref 32–36)
MCV RBC AUTO: 88.1 FL — SIGNIFICANT CHANGE UP (ref 80–100)
MONOCYTES # BLD AUTO: 0.91 K/UL — HIGH (ref 0–0.9)
MONOCYTES NFR BLD AUTO: 9.5 % — SIGNIFICANT CHANGE UP (ref 2–14)
NEUTROPHILS # BLD AUTO: 7.2 K/UL — SIGNIFICANT CHANGE UP (ref 1.8–7.4)
NEUTROPHILS NFR BLD AUTO: 75.6 % — SIGNIFICANT CHANGE UP (ref 43–77)
NITRITE UR-MCNC: NEGATIVE — SIGNIFICANT CHANGE UP
NRBC # BLD AUTO: 0 K/UL — SIGNIFICANT CHANGE UP (ref 0–0)
NRBC # FLD: 0 K/UL — SIGNIFICANT CHANGE UP (ref 0–0)
NRBC BLD AUTO-RTO: 0 /100 WBCS — SIGNIFICANT CHANGE UP (ref 0–0)
PH UR: 5.5 — SIGNIFICANT CHANGE UP (ref 5–8)
PLATELET # BLD AUTO: 171 K/UL — SIGNIFICANT CHANGE UP (ref 150–400)
PMV BLD: 13 FL — SIGNIFICANT CHANGE UP (ref 7–13)
POTASSIUM SERPL-MCNC: 4 MMOL/L — SIGNIFICANT CHANGE UP (ref 3.5–5.3)
POTASSIUM SERPL-SCNC: 4 MMOL/L — SIGNIFICANT CHANGE UP (ref 3.5–5.3)
PROT SERPL-MCNC: 6.8 G/DL — SIGNIFICANT CHANGE UP (ref 6–8.3)
PROT UR-MCNC: NEGATIVE MG/DL — SIGNIFICANT CHANGE UP
RBC # BLD: 3.44 M/UL — LOW (ref 3.8–5.2)
RBC # FLD: 13.5 % — SIGNIFICANT CHANGE UP (ref 10.3–14.5)
RBC CASTS # UR COMP ASSIST: 22 /HPF — HIGH (ref 0–4)
REVIEW: SIGNIFICANT CHANGE UP
SODIUM SERPL-SCNC: 137 MMOL/L — SIGNIFICANT CHANGE UP (ref 135–145)
SP GR SPEC: 1.01 — SIGNIFICANT CHANGE UP (ref 1–1.03)
SQUAMOUS # UR AUTO: 6 /HPF — HIGH (ref 0–5)
UROBILINOGEN FLD QL: 0.2 MG/DL — SIGNIFICANT CHANGE UP (ref 0.2–1)
WBC # BLD: 9.53 K/UL — SIGNIFICANT CHANGE UP (ref 3.8–10.5)
WBC # FLD AUTO: 9.53 K/UL — SIGNIFICANT CHANGE UP (ref 3.8–10.5)
WBC UR QL: 1 /HPF — SIGNIFICANT CHANGE UP (ref 0–5)

## 2025-08-06 PROCEDURE — 96374 THER/PROPH/DIAG INJ IV PUSH: CPT | Mod: XU

## 2025-08-06 PROCEDURE — 82947 ASSAY GLUCOSE BLOOD QUANT: CPT

## 2025-08-06 PROCEDURE — 71260 CT THORAX DX C+: CPT | Mod: 26

## 2025-08-06 PROCEDURE — 74177 CT ABD & PELVIS W/CONTRAST: CPT

## 2025-08-06 PROCEDURE — 85025 COMPLETE CBC W/AUTO DIFF WBC: CPT

## 2025-08-06 PROCEDURE — 85014 HEMATOCRIT: CPT

## 2025-08-06 PROCEDURE — 84132 ASSAY OF SERUM POTASSIUM: CPT

## 2025-08-06 PROCEDURE — 96375 TX/PRO/DX INJ NEW DRUG ADDON: CPT | Mod: XU

## 2025-08-06 PROCEDURE — 93975 VASCULAR STUDY: CPT

## 2025-08-06 PROCEDURE — 82803 BLOOD GASES ANY COMBINATION: CPT

## 2025-08-06 PROCEDURE — 71260 CT THORAX DX C+: CPT

## 2025-08-06 PROCEDURE — 76856 US EXAM PELVIC COMPLETE: CPT | Mod: 26,59

## 2025-08-06 PROCEDURE — 99285 EMERGENCY DEPT VISIT HI MDM: CPT

## 2025-08-06 PROCEDURE — 76830 TRANSVAGINAL US NON-OB: CPT | Mod: 26

## 2025-08-06 PROCEDURE — 81001 URINALYSIS AUTO W/SCOPE: CPT

## 2025-08-06 PROCEDURE — 80053 COMPREHEN METABOLIC PANEL: CPT

## 2025-08-06 PROCEDURE — 99285 EMERGENCY DEPT VISIT HI MDM: CPT | Mod: 25

## 2025-08-06 PROCEDURE — 83690 ASSAY OF LIPASE: CPT

## 2025-08-06 PROCEDURE — 82330 ASSAY OF CALCIUM: CPT

## 2025-08-06 PROCEDURE — 84295 ASSAY OF SERUM SODIUM: CPT

## 2025-08-06 PROCEDURE — 82435 ASSAY OF BLOOD CHLORIDE: CPT

## 2025-08-06 PROCEDURE — 74177 CT ABD & PELVIS W/CONTRAST: CPT | Mod: 26

## 2025-08-06 PROCEDURE — 76830 TRANSVAGINAL US NON-OB: CPT

## 2025-08-06 PROCEDURE — 84702 CHORIONIC GONADOTROPIN TEST: CPT

## 2025-08-06 PROCEDURE — 85018 HEMOGLOBIN: CPT

## 2025-08-06 PROCEDURE — 93975 VASCULAR STUDY: CPT | Mod: 26

## 2025-08-06 PROCEDURE — 99283 EMERGENCY DEPT VISIT LOW MDM: CPT

## 2025-08-06 PROCEDURE — 83605 ASSAY OF LACTIC ACID: CPT

## 2025-08-06 PROCEDURE — 96376 TX/PRO/DX INJ SAME DRUG ADON: CPT | Mod: XU

## 2025-08-06 PROCEDURE — 76856 US EXAM PELVIC COMPLETE: CPT

## 2025-08-06 RX ORDER — ACETAMINOPHEN 500 MG/5ML
1000 LIQUID (ML) ORAL ONCE
Refills: 0 | Status: COMPLETED | OUTPATIENT
Start: 2025-08-06 | End: 2025-08-06

## 2025-08-06 RX ORDER — KETOROLAC TROMETHAMINE 30 MG/ML
15 INJECTION, SOLUTION INTRAMUSCULAR; INTRAVENOUS ONCE
Refills: 0 | Status: DISCONTINUED | OUTPATIENT
Start: 2025-08-06 | End: 2025-08-06

## 2025-08-06 RX ORDER — INDOMETHACIN 50 MG
1 CAPSULE ORAL
Qty: 10 | Refills: 0
Start: 2025-08-06

## 2025-08-06 RX ADMIN — KETOROLAC TROMETHAMINE 15 MILLIGRAM(S): 30 INJECTION, SOLUTION INTRAMUSCULAR; INTRAVENOUS at 13:22

## 2025-08-06 RX ADMIN — KETOROLAC TROMETHAMINE 15 MILLIGRAM(S): 30 INJECTION, SOLUTION INTRAMUSCULAR; INTRAVENOUS at 17:25

## 2025-08-06 RX ADMIN — Medication 400 MILLIGRAM(S): at 17:29

## 2025-08-06 RX ADMIN — Medication 1000 MILLILITER(S): at 10:39

## 2025-08-06 RX ADMIN — Medication 2 MILLIGRAM(S): at 10:38

## 2025-08-11 ENCOUNTER — TRANSCRIPTION ENCOUNTER (OUTPATIENT)
Age: 45
End: 2025-08-11

## 2025-08-20 ENCOUNTER — TRANSCRIPTION ENCOUNTER (OUTPATIENT)
Age: 45
End: 2025-08-20

## 2025-09-03 ENCOUNTER — APPOINTMENT (OUTPATIENT)
Dept: OBGYN | Facility: CLINIC | Age: 45
End: 2025-09-03

## 2025-09-03 VITALS
BODY MASS INDEX: 33.63 KG/M2 | DIASTOLIC BLOOD PRESSURE: 85 MMHG | SYSTOLIC BLOOD PRESSURE: 123 MMHG | HEART RATE: 59 BPM | HEIGHT: 64 IN | WEIGHT: 197 LBS

## 2025-09-03 DIAGNOSIS — D25.9 LEIOMYOMA OF UTERUS, UNSPECIFIED: ICD-10-CM

## 2025-09-03 DIAGNOSIS — D25.0 INTRAMURAL LEIOMYOMA OF UTERUS: ICD-10-CM

## 2025-09-03 DIAGNOSIS — N93.9 ABNORMAL UTERINE AND VAGINAL BLEEDING, UNSPECIFIED: ICD-10-CM

## 2025-09-03 DIAGNOSIS — D25.1 INTRAMURAL LEIOMYOMA OF UTERUS: ICD-10-CM

## 2025-09-03 LAB
HCG UR QL: NEGATIVE
QUALITY CONTROL: YES

## 2025-09-03 PROCEDURE — 99459 PELVIC EXAMINATION: CPT

## 2025-09-03 PROCEDURE — 58100 BIOPSY OF UTERUS LINING: CPT

## 2025-09-03 PROCEDURE — 99213 OFFICE O/P EST LOW 20 MIN: CPT | Mod: 25

## 2025-09-05 LAB — CORE LAB BIOPSY: NORMAL
